# Patient Record
Sex: MALE | Race: BLACK OR AFRICAN AMERICAN | NOT HISPANIC OR LATINO | Employment: FULL TIME | URBAN - METROPOLITAN AREA
[De-identification: names, ages, dates, MRNs, and addresses within clinical notes are randomized per-mention and may not be internally consistent; named-entity substitution may affect disease eponyms.]

---

## 2019-11-11 ENCOUNTER — HOSPITAL ENCOUNTER (OUTPATIENT)
Dept: RADIOLOGY | Facility: CLINIC | Age: 24
Discharge: HOME | End: 2019-11-11
Attending: FAMILY MEDICINE
Payer: COMMERCIAL

## 2019-11-11 ENCOUNTER — OFFICE VISIT (OUTPATIENT)
Dept: FAMILY MEDICINE | Facility: CLINIC | Age: 24
End: 2019-11-11
Payer: COMMERCIAL

## 2019-11-11 VITALS
DIASTOLIC BLOOD PRESSURE: 72 MMHG | HEIGHT: 71 IN | SYSTOLIC BLOOD PRESSURE: 136 MMHG | WEIGHT: 187.38 LBS | HEART RATE: 85 BPM | BODY MASS INDEX: 26.23 KG/M2 | TEMPERATURE: 98 F | OXYGEN SATURATION: 99 %

## 2019-11-11 DIAGNOSIS — Z23 NEED FOR TDAP VACCINATION: ICD-10-CM

## 2019-11-11 DIAGNOSIS — Z00.00 WELLNESS EXAMINATION: Primary | ICD-10-CM

## 2019-11-11 DIAGNOSIS — G89.29 CHRONIC PAIN IN RIGHT FOOT: ICD-10-CM

## 2019-11-11 DIAGNOSIS — M79.671 CHRONIC PAIN IN RIGHT FOOT: ICD-10-CM

## 2019-11-11 PROCEDURE — 73630 X-RAY EXAM OF FOOT: CPT | Mod: TC,FY,PO,RT

## 2019-11-11 PROCEDURE — 99999 PR PBB SHADOW E&M-NEW PATIENT-LVL III: CPT | Mod: PBBFAC,,, | Performed by: FAMILY MEDICINE

## 2019-11-11 PROCEDURE — 90471 TDAP VACCINE GREATER THAN OR EQUAL TO 7YO IM: ICD-10-PCS | Mod: S$GLB,,, | Performed by: FAMILY MEDICINE

## 2019-11-11 PROCEDURE — 73630 X-RAY EXAM OF FOOT: CPT | Mod: 26,RT,S$GLB, | Performed by: RADIOLOGY

## 2019-11-11 PROCEDURE — 90471 IMMUNIZATION ADMIN: CPT | Mod: S$GLB,,, | Performed by: FAMILY MEDICINE

## 2019-11-11 PROCEDURE — 90715 TDAP VACCINE GREATER THAN OR EQUAL TO 7YO IM: ICD-10-PCS | Mod: S$GLB,,, | Performed by: FAMILY MEDICINE

## 2019-11-11 PROCEDURE — 99395 PREV VISIT EST AGE 18-39: CPT | Mod: 25,S$GLB,, | Performed by: FAMILY MEDICINE

## 2019-11-11 PROCEDURE — 73630 XR FOOT COMPLETE 3 VIEW RIGHT: ICD-10-PCS | Mod: 26,RT,S$GLB, | Performed by: RADIOLOGY

## 2019-11-11 PROCEDURE — 99999 PR PBB SHADOW E&M-NEW PATIENT-LVL III: ICD-10-PCS | Mod: PBBFAC,,, | Performed by: FAMILY MEDICINE

## 2019-11-11 PROCEDURE — 90715 TDAP VACCINE 7 YRS/> IM: CPT | Mod: S$GLB,,, | Performed by: FAMILY MEDICINE

## 2019-11-11 PROCEDURE — 99395 PR PREVENTIVE VISIT,EST,18-39: ICD-10-PCS | Mod: 25,S$GLB,, | Performed by: FAMILY MEDICINE

## 2019-11-11 NOTE — PROGRESS NOTES
Identified pt using name and . TDAP vaccine was administered in left deltoid using sterile technique. No bleeding noted at injection site.

## 2019-11-11 NOTE — PROGRESS NOTES
Subjective:       Patient ID: Torsten Delacruz Jr. is a 24 y.o. male.    Chief Complaint: Establish Care    HPI   Patient presents to establish care with a new family doctor and for evaluation of right ft pains that he tells me have been chronic since probably his teenage years.  He has not seen a doctor since about age 16 or 17 as he tells me is healthy without any past medical history except for a single episode of bronchitis as a child.  He did have his right foot exam and once and was told there were no abnormalities and believes he was told the pains were secondary to growing pains.  He has no pains currently but tells me yesterday with walking out of the blue he had a sharp stabbing pain that went through his midfoot from top to bottom.  This lasted only for a split 2nd at 10/10 pain intensity before becoming a mild to moderate dull achy pain that improved and resolved over minutes.  He did not have any radiation of the pain or any decrease in strength, coordination, sensation or active range of motion but tells me he did have to sit down secondary to the pains.  After these resolved he was able to resume his normal activities without any pains or other limitations.  These pains bother him sporadically in clusters but he has weeks to months without any pains whatsoever.  He has not had any labs or vaccinations since he was a child and question him about this he tells me he would like his yearly wellness physical and complete any possible labs today.  He had no other concerns or complaints and tells me currently I feel fine.    Review of Systems   Constitutional: Negative for activity change, appetite change, fatigue and unexpected weight change.   HENT: Negative for congestion, hearing loss, sore throat, trouble swallowing and voice change.    Eyes: Negative for visual disturbance.   Respiratory: Negative for cough, chest tightness, shortness of breath and wheezing.    Cardiovascular: Negative for chest pain,  "palpitations and leg swelling.   Gastrointestinal: Negative for abdominal pain, blood in stool, constipation, diarrhea, nausea and vomiting.   Genitourinary: Negative for difficulty urinating, dysuria, flank pain, frequency, hematuria and urgency.   Musculoskeletal: Positive for arthralgias (Right foot only as in the HPI.  No pain currently.). Negative for back pain, gait problem, joint swelling, myalgias, neck pain and neck stiffness.   Skin: Negative for rash and wound.   Neurological: Negative for dizziness, tremors, syncope, weakness, light-headedness, numbness and headaches.   Hematological: Negative for adenopathy. Does not bruise/bleed easily.   Psychiatric/Behavioral: Negative for dysphoric mood and sleep disturbance. The patient is not nervous/anxious.          Objective:      Vitals:    11/11/19 0906   BP: 136/72   Pulse: 85   Temp: 97.7 °F (36.5 °C)   TempSrc: Oral   SpO2: 99%   Weight: 85 kg (187 lb 6.3 oz)   Height: 5' 11" (1.803 m)   PainSc:   3   PainLoc: Generalized     Physical Exam   Constitutional: He is oriented to person, place, and time. He appears well-developed and well-nourished. No distress.   HENT:   Head: Normocephalic and atraumatic.   Right Ear: External ear normal.   Left Ear: External ear normal.   Nose: Nose normal.   Mouth/Throat: Oropharynx is clear and moist.   Eyes: Pupils are equal, round, and reactive to light. Conjunctivae and EOM are normal. No scleral icterus.   Neck: Trachea normal, normal range of motion and phonation normal. Neck supple. No JVD present. No thyroid mass and no thyromegaly present.   Cardiovascular: Normal rate, regular rhythm and normal heart sounds. Exam reveals no gallop and no friction rub.   No murmur heard.  Pulmonary/Chest: Effort normal and breath sounds normal. No respiratory distress. He has no wheezes.   Abdominal: Soft. Bowel sounds are normal. He exhibits no distension and no mass. There is no tenderness. There is no guarding. "   Musculoskeletal: Normal range of motion. He exhibits no edema or deformity.        Right hip: Normal.        Right knee: Normal.        Right ankle: Normal.        Right upper leg: Normal.        Right lower leg: Normal.        Right foot: Normal.   Lymphadenopathy:     He has no cervical adenopathy.   Neurological: He is alert and oriented to person, place, and time.   Skin: Skin is warm and dry. He is not diaphoretic.   Psychiatric: He has a normal mood and affect. His behavior is normal. Judgment and thought content normal.   Nursing note and vitals reviewed.        Assessment:       1. Wellness examination    2. Chronic pain in right foot    3. Need for Tdap vaccination          Plan:   Wellness examination  -     CBC auto differential; Future; Expected date: 11/11/2019  -     Comprehensive metabolic panel; Future; Expected date: 11/11/2019  -     Lipid panel; Future; Expected date: 11/11/2019  -     TSH; Future; Expected date: 11/11/2019    Chronic pain in right foot  -     X-Ray Foot Complete Right; Future; Expected date: 11/11/2019    Need for Tdap vaccination  -     Tdap Vaccine      Follow up in about 1 year (around 11/11/2020).    Ed appears to be stable and doing well today without any current complaints.  I am unsure of the cause of his chronic but sporadic right foot pains as I could not reproduce these today and found no abnormalities on examination of his right lower extremity.  He was very interested in a CT or MRI and I discussed checking an x-ray with chronicity of his pains.  He was very interested in this.  I did discuss use of stretching, heat/ice and deep topical muscle rub that he could try for pain relief if necessary as he had no interest in taking any over-the-counter pain medications including Tylenol or any NSAIDs.  I discussed screening labs as above and he was interested in all of these.  I did discuss STD screening with him but he had no interest in this.  I discussed the risks and  benefits of the Tdap and influenza vaccines with him any advised me that he has never had a flu shot and did not want this secondary to never having the flu.  I did discuss the risks of going unvaccinated any understands these and still had no interest in a flu shot.  He was interested in Tdap vaccination today.  As long as no significant abnormalities are found on his x-ray a labs and his foot pains remain stable, improve or resolve with the above exercises I will see him back at yearly interval.  Sooner if any problems.

## 2020-03-06 ENCOUNTER — HOSPITAL ENCOUNTER (EMERGENCY)
Facility: HOSPITAL | Age: 25
Discharge: HOME | End: 2020-03-06
Attending: EMERGENCY MEDICINE
Payer: MEDICAID

## 2020-03-06 VITALS
HEIGHT: 72 IN | BODY MASS INDEX: 24.38 KG/M2 | TEMPERATURE: 99 F | SYSTOLIC BLOOD PRESSURE: 137 MMHG | HEART RATE: 85 BPM | WEIGHT: 180 LBS | OXYGEN SATURATION: 98 % | RESPIRATION RATE: 17 BRPM | DIASTOLIC BLOOD PRESSURE: 66 MMHG

## 2020-03-06 DIAGNOSIS — S69.91XA FINGER INJURY, RIGHT, INITIAL ENCOUNTER: ICD-10-CM

## 2020-03-06 DIAGNOSIS — S61.209A AVULSION OF FINGER TIP, INITIAL ENCOUNTER: Primary | ICD-10-CM

## 2020-03-06 PROCEDURE — 99282 EMERGENCY DEPT VISIT SF MDM: CPT

## 2020-03-06 NOTE — DISCHARGE INSTRUCTIONS
Clean and dry, antibiotic topically return to ER for any signs of infection.  Use splint to protect finger from injury.  Continue with wound care return to the ER for any concerning symptoms

## 2020-03-07 NOTE — ED PROVIDER NOTES
Encounter Date: 3/6/2020       History     Chief Complaint   Patient presents with    Laceration     24 to male presenting with finger tip avulsion while working at [employer], Pt states pain to 2nd dit right hand after getting tip caught in slicer.  Last Td 3 years ago pt states.  Denies any other injury.  States bleeding has stopped.          Review of patient's allergies indicates:  No Known Allergies  Past Medical History:   Diagnosis Date    Bronchitis     once as a child     Past Surgical History:   Procedure Laterality Date    CIRCUMCISION       No family history on file.  Social History     Tobacco Use    Smoking status: Never Smoker    Smokeless tobacco: Never Used   Substance Use Topics    Alcohol use: No    Drug use: No     Review of Systems   Skin: Positive for wound.   All other systems reviewed and are negative.      Physical Exam     Initial Vitals [03/06/20 1436]   BP Pulse Resp Temp SpO2   135/89 89 16 99.2 °F (37.3 °C) 100 %      MAP       --         Physical Exam    Nursing note and vitals reviewed.  Constitutional: He appears well-developed and well-nourished.   HENT:   Head: Atraumatic.   Right Ear: External ear normal.   Mouth/Throat: Oropharynx is clear and moist.   Eyes: EOM are normal.   Neck: Neck supple.   Cardiovascular: Normal rate and regular rhythm.   Pulmonary/Chest: Breath sounds normal.   Abdominal: Soft. Bowel sounds are normal.   Musculoskeletal: Normal range of motion. He exhibits tenderness.   .5cm tip avulsion right 2nd digit with some nail affected. No nail bed laceration, no active bleeding nothing to suture    Neurological: He is oriented to person, place, and time. He has normal strength.   Skin: Skin is warm.         ED Course   Procedures  Labs Reviewed - No data to display       Imaging Results    None          Medical Decision Making:   ED Management:  Cleaned and dressed by nurse finger splint for comfort.                                   Clinical Impression:        ICD-10-CM ICD-9-CM   1. Avulsion of finger tip, initial encounter S61.209A 883.0   2. Finger injury, right, initial encounter S69.91XA 959.5         Disposition:   Disposition: Discharged  Condition: Stable     ED Disposition Condition    Discharge Stable        ED Prescriptions     None        Follow-up Information     Follow up With Specialties Details Why Contact Info    Femi Burger, DO Family Medicine In 2 days For wound re-check [address] [facility] [address]  [phone]                                       Carlee Duque PA-C  03/06/20 2153

## 2020-07-09 ENCOUNTER — OCCUPATIONAL HEALTH (OUTPATIENT)
Dept: URGENT CARE | Facility: CLINIC | Age: 25
End: 2020-07-09
Payer: MEDICAID

## 2020-07-09 DIAGNOSIS — Z02.1 PRE-EMPLOYMENT EXAMINATION: Primary | ICD-10-CM

## 2020-07-09 PROCEDURE — 80305 DRUG TEST PRSMV DIR OPT OBS: CPT | Mod: S$GLB,,, | Performed by: FAMILY MEDICINE

## 2020-07-09 PROCEDURE — 80305 OOH NON-DOT DRUG SCREEN: ICD-10-PCS | Mod: S$GLB,,, | Performed by: FAMILY MEDICINE

## 2020-07-09 PROCEDURE — 99499 UNLISTED E&M SERVICE: CPT | Mod: S$GLB,,, | Performed by: FAMILY MEDICINE

## 2020-07-09 PROCEDURE — 99499 PHYSICAL, BASIC COMPLEXITY: ICD-10-PCS | Mod: S$GLB,,, | Performed by: FAMILY MEDICINE

## 2020-11-12 ENCOUNTER — LAB VISIT (OUTPATIENT)
Dept: LAB | Facility: HOSPITAL | Age: 25
End: 2020-11-12
Attending: STUDENT IN AN ORGANIZED HEALTH CARE EDUCATION/TRAINING PROGRAM
Payer: COMMERCIAL

## 2020-11-12 ENCOUNTER — OFFICE VISIT (OUTPATIENT)
Dept: FAMILY MEDICINE | Facility: CLINIC | Age: 25
End: 2020-11-12
Payer: COMMERCIAL

## 2020-11-12 VITALS
HEIGHT: 72 IN | WEIGHT: 192.25 LBS | TEMPERATURE: 98 F | HEART RATE: 82 BPM | RESPIRATION RATE: 16 BRPM | SYSTOLIC BLOOD PRESSURE: 110 MMHG | BODY MASS INDEX: 26.04 KG/M2 | OXYGEN SATURATION: 99 % | DIASTOLIC BLOOD PRESSURE: 72 MMHG

## 2020-11-12 DIAGNOSIS — R74.01 ELEVATED ALT MEASUREMENT: ICD-10-CM

## 2020-11-12 DIAGNOSIS — R17 TOTAL BILIRUBIN, ELEVATED: ICD-10-CM

## 2020-11-12 DIAGNOSIS — Z00.00 ENCOUNTER FOR PREVENTIVE HEALTH EXAMINATION: ICD-10-CM

## 2020-11-12 DIAGNOSIS — Z00.00 ENCOUNTER FOR PREVENTIVE HEALTH EXAMINATION: Primary | ICD-10-CM

## 2020-11-12 DIAGNOSIS — E78.5 HYPERLIPIDEMIA, UNSPECIFIED HYPERLIPIDEMIA TYPE: ICD-10-CM

## 2020-11-12 LAB
ALBUMIN SERPL BCP-MCNC: 4.5 G/DL (ref 3.5–5.2)
ALP SERPL-CCNC: 45 U/L (ref 55–135)
ALT SERPL W/O P-5'-P-CCNC: 21 U/L (ref 10–44)
ANION GAP SERPL CALC-SCNC: 9 MMOL/L (ref 8–16)
AST SERPL-CCNC: 20 U/L (ref 10–40)
BASOPHILS # BLD AUTO: 0.03 K/UL (ref 0–0.2)
BASOPHILS NFR BLD: 0.7 % (ref 0–1.9)
BILIRUB SERPL-MCNC: 1.3 MG/DL (ref 0.1–1)
BUN SERPL-MCNC: 17 MG/DL (ref 6–20)
CALCIUM SERPL-MCNC: 9.6 MG/DL (ref 8.7–10.5)
CHLORIDE SERPL-SCNC: 104 MMOL/L (ref 95–110)
CHOLEST SERPL-MCNC: 180 MG/DL (ref 120–199)
CHOLEST/HDLC SERPL: 3.8 {RATIO} (ref 2–5)
CO2 SERPL-SCNC: 26 MMOL/L (ref 23–29)
CREAT SERPL-MCNC: 1.1 MG/DL (ref 0.5–1.4)
DIFFERENTIAL METHOD: NORMAL
EOSINOPHIL # BLD AUTO: 0.1 K/UL (ref 0–0.5)
EOSINOPHIL NFR BLD: 1.2 % (ref 0–8)
ERYTHROCYTE [DISTWIDTH] IN BLOOD BY AUTOMATED COUNT: 12 % (ref 11.5–14.5)
EST. GFR  (AFRICAN AMERICAN): >60 ML/MIN/1.73 M^2
EST. GFR  (NON AFRICAN AMERICAN): >60 ML/MIN/1.73 M^2
ESTIMATED AVG GLUCOSE: 97 MG/DL (ref 68–131)
GLUCOSE SERPL-MCNC: 82 MG/DL (ref 70–110)
HBA1C MFR BLD HPLC: 5 % (ref 4–5.6)
HCT VFR BLD AUTO: 50.4 % (ref 40–54)
HDLC SERPL-MCNC: 47 MG/DL (ref 40–75)
HDLC SERPL: 26.1 % (ref 20–50)
HGB BLD-MCNC: 16.8 G/DL (ref 14–18)
IMM GRANULOCYTES # BLD AUTO: 0.01 K/UL (ref 0–0.04)
IMM GRANULOCYTES NFR BLD AUTO: 0.2 % (ref 0–0.5)
LDLC SERPL CALC-MCNC: 118.6 MG/DL (ref 63–159)
LYMPHOCYTES # BLD AUTO: 1.3 K/UL (ref 1–4.8)
LYMPHOCYTES NFR BLD: 30.7 % (ref 18–48)
MCH RBC QN AUTO: 28.4 PG (ref 27–31)
MCHC RBC AUTO-ENTMCNC: 33.3 G/DL (ref 32–36)
MCV RBC AUTO: 85 FL (ref 82–98)
MONOCYTES # BLD AUTO: 0.5 K/UL (ref 0.3–1)
MONOCYTES NFR BLD: 10.9 % (ref 4–15)
NEUTROPHILS # BLD AUTO: 2.4 K/UL (ref 1.8–7.7)
NEUTROPHILS NFR BLD: 56.3 % (ref 38–73)
NONHDLC SERPL-MCNC: 133 MG/DL
NRBC BLD-RTO: 0 /100 WBC
PLATELET # BLD AUTO: 248 K/UL (ref 150–350)
PMV BLD AUTO: 10.2 FL (ref 9.2–12.9)
POTASSIUM SERPL-SCNC: 4.1 MMOL/L (ref 3.5–5.1)
PROT SERPL-MCNC: 7.8 G/DL (ref 6–8.4)
RBC # BLD AUTO: 5.91 M/UL (ref 4.6–6.2)
SODIUM SERPL-SCNC: 139 MMOL/L (ref 136–145)
TRIGL SERPL-MCNC: 72 MG/DL (ref 30–150)
WBC # BLD AUTO: 4.33 K/UL (ref 3.9–12.7)

## 2020-11-12 PROCEDURE — 36415 COLL VENOUS BLD VENIPUNCTURE: CPT | Mod: PO

## 2020-11-12 PROCEDURE — 87340 HEPATITIS B SURFACE AG IA: CPT

## 2020-11-12 PROCEDURE — 83036 HEMOGLOBIN GLYCOSYLATED A1C: CPT

## 2020-11-12 PROCEDURE — 1126F PR PAIN SEVERITY QUANTIFIED, NO PAIN PRESENT: ICD-10-PCS | Mod: S$GLB,,, | Performed by: STUDENT IN AN ORGANIZED HEALTH CARE EDUCATION/TRAINING PROGRAM

## 2020-11-12 PROCEDURE — 1126F AMNT PAIN NOTED NONE PRSNT: CPT | Mod: S$GLB,,, | Performed by: STUDENT IN AN ORGANIZED HEALTH CARE EDUCATION/TRAINING PROGRAM

## 2020-11-12 PROCEDURE — 99999 PR PBB SHADOW E&M-EST. PATIENT-LVL III: ICD-10-PCS | Mod: PBBFAC,,, | Performed by: STUDENT IN AN ORGANIZED HEALTH CARE EDUCATION/TRAINING PROGRAM

## 2020-11-12 PROCEDURE — 80061 LIPID PANEL: CPT

## 2020-11-12 PROCEDURE — 99214 PR OFFICE/OUTPT VISIT, EST, LEVL IV, 30-39 MIN: ICD-10-PCS | Mod: S$GLB,,, | Performed by: STUDENT IN AN ORGANIZED HEALTH CARE EDUCATION/TRAINING PROGRAM

## 2020-11-12 PROCEDURE — 86803 HEPATITIS C AB TEST: CPT

## 2020-11-12 PROCEDURE — 99999 PR PBB SHADOW E&M-EST. PATIENT-LVL III: CPT | Mod: PBBFAC,,, | Performed by: STUDENT IN AN ORGANIZED HEALTH CARE EDUCATION/TRAINING PROGRAM

## 2020-11-12 PROCEDURE — 3008F BODY MASS INDEX DOCD: CPT | Mod: CPTII,S$GLB,, | Performed by: STUDENT IN AN ORGANIZED HEALTH CARE EDUCATION/TRAINING PROGRAM

## 2020-11-12 PROCEDURE — 3008F PR BODY MASS INDEX (BMI) DOCUMENTED: ICD-10-PCS | Mod: CPTII,S$GLB,, | Performed by: STUDENT IN AN ORGANIZED HEALTH CARE EDUCATION/TRAINING PROGRAM

## 2020-11-12 PROCEDURE — 80053 COMPREHEN METABOLIC PANEL: CPT

## 2020-11-12 PROCEDURE — 99214 OFFICE O/P EST MOD 30 MIN: CPT | Mod: S$GLB,,, | Performed by: STUDENT IN AN ORGANIZED HEALTH CARE EDUCATION/TRAINING PROGRAM

## 2020-11-12 PROCEDURE — 85025 COMPLETE CBC W/AUTO DIFF WBC: CPT

## 2020-11-12 NOTE — PROGRESS NOTES
[facility]  Subjective:    Chief Complaint:   Chief Complaint   Patient presents with    Annual Exam       History of Present Illness:  25 y.o. male presents for multiple issues.      Hyperlipidemia-stable repeat today he reports some weight gain with decreased activity.  Recommended diet exercise and will check this yearly.  Use corn teen and knots is moving LEs.   Elevated ALT-control uncertain will recheck today. Will rule out hcv and hep b and suspect fatty liver. Will recheck. Rec diet and exercise. No alchol use   Elev T bili repeat suspect Gilbert's syndrome will monitor no symptoms   Prev- will get blood work and he does not want vaccines.     This is the extent of the patient's complaints at this present time.     He denies chest pain upon exertion, dyspnea, nausea, vomiting, diaphoresis, and syncope. No pleuritic chest pain, unilateral leg swelling, calf tenderness, or calf pain.     He denies having a family history of cancer.     The following portions of the patient's history were reviewed and updated as appropriate: allergies, current medications, past family history, past medical history, past social history, past surgical history and problem list.    Review of Systems [Negative unless checked off]  Gen ROS: []fever []chills []weight loss []malaise []fatigue   Neuro: []dizzy []numbness []LOC []weakness []headaches   Psych ROS: []hallucinate [] depression []anxiety []suicidal ideation    ENT ROS: []congestion []rhinorrhea []sore throat []neck pain []hearing loss   Eye ROS: []hazy vision [] diplopia []photophobia []eye pain    Pulm ROS: []cough []pleuritic pain []dyspnea []wheezing    [facility] : []chest pain []SOB on exertion []orthopnea []PND []leg swelling   GI ROS: []n/v []abd pain []diarrhea []constipation []blood/black stool   Uro ROS: []dysuria []frequency []flank pain [] trouble voiding []hematuria   MSK ROS: []myalgias []joint pain []neck pain []back pain [] falls   Derm  ROS: []pruritis []rash []jaundice        Past Medical History:   Diagnosis Date    Bronchitis     once as a child    Hyperlipidemia      Past Surgical History:   Procedure Laterality Date    CIRCUMCISION       Social History  Social History     Tobacco Use    Smoking status: Never Smoker    Smokeless tobacco: Never Used   Substance Use Topics    Alcohol use: No    Drug use: No     Family History   Problem Relation Age of Onset    No Known Problems Mother     Diabetes Father      Review of patient's allergies indicates:  No Known Allergies    Physical Examination  /72 (BP Location: Right arm, Patient Position: Sitting, BP Method: Large (Manual))   Pulse 82   Temp 97.8 °F (36.6 °C) (Temporal)   Resp 16   Ht 6' (1.829 m)   Wt 87.2 kg (192 lb 3.9 oz)   SpO2 99%   BMI 26.07 kg/m²   Wt Readings from Last 3 Encounters:   11/12/20 87.2 kg (192 lb 3.9 oz)   03/06/20 81.6 kg (180 lb)   11/11/19 85 kg (187 lb 6.3 oz)     BP Readings from Last 3 Encounters:   11/12/20 110/72   03/06/20 137/66   11/11/19 136/72     Estimated body mass index is 26.07 kg/m² as calculated from the following:    Height as of this encounter: 6' (1.829 m).    Weight as of this encounter: 87.2 kg (192 lb 3.9 oz).     General appearance: alert, cooperative, no distress  Eyes: pupils equal and reactive, extraocular eye movements intact   Ears: bilateral TM's and external ear canals normal   Nose: normal and patent, no erythema, discharge or polyps   Sinuses: Normal paranasal sinuses without tenderness   Throat: mucous membranes moist, pharynx normal without lesions   Neck: no thyromegaly, trachea midline  Lungs: clear to auscultation, no wheezes, rales or rhonchi, symmetric air entry, no dullness to percussion bilaterally.  Heart: normal rate, regular rhythm, normal S1, S2, no murmurs, rubs, clicks or gallops, no displacement of the PMI.  Abdomen: soft, nontender, nondistended, no masses or organomegaly No rigidity, rebound, or  guarding.   Back: full range of motion, no tenderness, palpable spasm or pain on motion   Extremities: peripheral pulses normal, no pedal edema, no clubbing or cyanosis   Feet: warm, good capillary refill.  Neurological:alert, oriented, normal speech, no focal findings or movement disorder noted   Psychiatric: alert, oriented to person, place, and time  Integument: normal coloration and turgor, no rashes, no suspicious skin lesions noted.    Data reviewed    Previous medical records reviewed and summarized above in HPI.     Laboratory    I have reviewed old labs below:    Lab Results   Component Value Date    WBC 5.21 11/11/2019    HGB 16.9 11/11/2019    HCT 51.6 11/11/2019    MCV 86 11/11/2019     11/11/2019     11/11/2019    K 3.9 11/11/2019     11/11/2019    CALCIUM 10.1 11/11/2019    CO2 27 11/11/2019    GLU 74 11/11/2019    BUN 14 11/11/2019    CREATININE 1.1 11/11/2019    ANIONGAP 9 11/11/2019    ESTGFRAFRICA >60.0 11/11/2019    EGFRNONAA >60.0 11/11/2019    PROT 8.0 11/11/2019    ALBUMIN 4.6 11/11/2019    BILITOT 1.1 (H) 11/11/2019    ALKPHOS 52 (L) 11/11/2019    ALT 30 11/11/2019    AST 47 (H) 11/11/2019    CHOL 210 (H) 11/11/2019    TRIG 87 11/11/2019    HDL 49 11/11/2019    LDLCALC 143.6 11/11/2019    TSH 1.543 11/11/2019     Lab reviewed by me: Particular labs of significance that I will monitor, workup, or treat to improve are marked below.     elev cholesterol and elv alt     Assessment/Plan    Torsten Delacruz Jr. is a 25 y.o. male who presents to clinic with:    1. Encounter for preventive health examination    2. Hyperlipidemia, unspecified hyperlipidemia type    3. Elevated ALT measurement    4. Total bilirubin, elevated         Diagnostic impression remarks      Hyperlipidemia-stable repeat today he reports some weight gain with decreased activity.  Recommended diet exercise and will check this yearly.  Use corn teen and knots is moving LEs.   Elevated ALT-control uncertain will  recheck today. Will rule out hcv and hep b and suspect fatty liver. Will recheck. Rec diet and exercise. No alchol use   Elev T bili repeat suspect Gilbert's syndrome will monitor no symptoms   Prev- will get blood work and he does not want vaccines.     BMI Goal    Counseled patient on his ideal body weight, health consequences of being obese and current recommendations including weekly exercise and a heart healthy diet.  He is aware that ideal BMI < 25  Estimated body mass index is 26.07 kg/m² as calculated from the following:    Height as of this encounter: 6' (1.829 m).    Weight as of this encounter: 87.2 kg (192 lb 3.9 oz).     He was counseled about the importance of healthy dietary habits as well as routine physical activity and exercise for better health outcomes. I also discussed the importance of cancer screening.     Medication Monitoring    In today's visit, monitoring for drug toxicity was accomplished. Proper use of medications was also discussed.     Counseling    Eat Less Unhealthy Fat   -Cut back on saturated fats and trans (also called hydrogenated) fats. A diet thats high in these fats increases your bad cholesterol. Its not enough to just cut back on foods containing cholesterol.   -Eat about 2 servings of fish per week. Most fish contain omega-3 fatty acids. These help lower blood cholesterol.   -Eat more whole grains and soluble fiber (such as oat bran). These lower overall cholesterol.   -Counseled that most cholesterol is made in the liver and only a minority comes from diet.    Be Active   -Choose an activity you enjoy. Walking, swimming, and riding a bike are some good ways to be active.   -Start at a level where you feel comfortable. Increase your time and pace a little each week.   -Work up to 30 minutes on most days. You can break this up into three 10-minute periods.   -Remember, some activity is better than none.   -If you havent been exercising regularly, start slowly. Check  with your doctor to make sure the exercise plan is right for you.     Take Medication As Directed: Many patients need medication to get their LDL levels to a safe level. Medication to lower cholesterol levels is effective and safe. (But taking medication is not a substitute for exercise or watching your diet!).      I discussed imaging findings, diagnosis, possibilities, treatment options, medications, risks, and benefits. He had many questions regarding the options and long-term effects. All questions were answered. He expressed understanding after counseling regarding the diagnosis and recommendations. He was capable and demonstrated competence with understanding of these options. Shared decision making was performed resulting in him choosing the current treatment plan.     I also discussed the importance of close follow up to discuss labs, change or modify his medications if needed, monitor side effects, and further evaluation of medical problems.     Additional workup planned: see labs ordered below.    See below for labs and meds ordered with associated diagnosis    1. Encounter for preventive health examination  - Lipid Panel; Future  - Comprehensive Metabolic Panel; Future  - CBC Auto Differential; Future  - Hemoglobin A1C; Future    2. Hyperlipidemia, unspecified hyperlipidemia type    3. Elevated ALT measurement  - Hepatitis C Antibody; Future  - Hepatitis B Surface Antigen; Future    4. Total bilirubin, elevated       Modified Medications    No medications on file       Follow up in about 1 year (around 11/12/2021). for further workup and reassessment if labs and tests obtained are stable or sooner as needed. He was instructed to call the clinic or go to the emergency department if his symptoms do not improve, worsens, or if new symptoms develop. Patient knows to call any time if an emergency arises. Shared decision making occurred and he verbalized understanding in agreement with this plan.  "    Documentation entered by me for this encounter may have been done in part using speech-recognition technology. Although I have made an effort to ensure accuracy, "sound like" errors may exist and should be interpreted in context.     Brad Cornejo MD  11/12/2020     "

## 2020-11-13 LAB
HBV SURFACE AG SERPL QL IA: NEGATIVE
HCV AB SERPL QL IA: NEGATIVE

## 2020-11-13 NOTE — PROGRESS NOTES
Results are mildly abnormal but no change in therapy is needed at this time except monitoring with follow up. Patient notified of results and to continue current treatment plan we discussed in clinic. Elevated T bili likely from Gilbert's syndrome.     Counseling included discussion regarding imaging findings, diagnosis, possibilities, treatment options, medications, risks, and benefits. All questions were answered.      I discussed the importance of close follow up to discuss labs, change or modify medications if needed, monitor side effects, and further evaluation of medical problems.     Brad Cornejo MD  11/13/2020

## 2020-12-12 ENCOUNTER — PATIENT MESSAGE (OUTPATIENT)
Dept: FAMILY MEDICINE | Facility: CLINIC | Age: 25
End: 2020-12-12

## 2021-05-10 ENCOUNTER — PATIENT MESSAGE (OUTPATIENT)
Dept: RESEARCH | Facility: HOSPITAL | Age: 26
End: 2021-05-10

## 2021-05-10 ENCOUNTER — HOSPITAL ENCOUNTER (EMERGENCY)
Facility: HOSPITAL | Age: 26
Discharge: HOME | End: 2021-05-10
Attending: EMERGENCY MEDICINE
Payer: COMMERCIAL

## 2021-05-10 VITALS
OXYGEN SATURATION: 96 % | DIASTOLIC BLOOD PRESSURE: 70 MMHG | TEMPERATURE: 98 F | RESPIRATION RATE: 21 BRPM | BODY MASS INDEX: 26.6 KG/M2 | HEIGHT: 71 IN | WEIGHT: 190 LBS | SYSTOLIC BLOOD PRESSURE: 113 MMHG | HEART RATE: 75 BPM

## 2021-05-10 DIAGNOSIS — T78.40XA ALLERGIC REACTION, INITIAL ENCOUNTER: ICD-10-CM

## 2021-05-10 DIAGNOSIS — L50.9 URTICARIA: Primary | ICD-10-CM

## 2021-05-10 LAB
ALBUMIN SERPL BCP-MCNC: 4.8 G/DL (ref 3.5–5.2)
ALP SERPL-CCNC: 45 U/L (ref 55–135)
ALT SERPL W/O P-5'-P-CCNC: 36 U/L (ref 10–44)
ANION GAP SERPL CALC-SCNC: 11 MMOL/L (ref 8–16)
AST SERPL-CCNC: 30 U/L (ref 10–40)
BASOPHILS # BLD AUTO: 0.02 K/UL (ref 0–0.2)
BASOPHILS NFR BLD: 0.4 % (ref 0–1.9)
BILIRUB SERPL-MCNC: 1.4 MG/DL (ref 0.1–1)
BILIRUB UR QL STRIP: NEGATIVE
BUN SERPL-MCNC: 15 MG/DL (ref 6–20)
CALCIUM SERPL-MCNC: 9.1 MG/DL (ref 8.7–10.5)
CHLORIDE SERPL-SCNC: 101 MMOL/L (ref 95–110)
CLARITY UR: CLEAR
CO2 SERPL-SCNC: 26 MMOL/L (ref 23–29)
COLOR UR: YELLOW
CREAT SERPL-MCNC: 1.1 MG/DL (ref 0.5–1.4)
DIFFERENTIAL METHOD: NORMAL
EOSINOPHIL # BLD AUTO: 0.1 K/UL (ref 0–0.5)
EOSINOPHIL NFR BLD: 1.1 % (ref 0–8)
ERYTHROCYTE [DISTWIDTH] IN BLOOD BY AUTOMATED COUNT: 12.2 % (ref 11.5–14.5)
EST. GFR  (AFRICAN AMERICAN): >60 ML/MIN/1.73 M^2
EST. GFR  (NON AFRICAN AMERICAN): >60 ML/MIN/1.73 M^2
GLUCOSE SERPL-MCNC: 101 MG/DL (ref 70–110)
GLUCOSE UR QL STRIP: NEGATIVE
HCT VFR BLD AUTO: 45.9 % (ref 40–54)
HGB BLD-MCNC: 15.7 G/DL (ref 14–18)
HGB UR QL STRIP: NEGATIVE
IMM GRANULOCYTES # BLD AUTO: 0.01 K/UL (ref 0–0.04)
IMM GRANULOCYTES NFR BLD AUTO: 0.2 % (ref 0–0.5)
KETONES UR QL STRIP: NEGATIVE
LEUKOCYTE ESTERASE UR QL STRIP: NEGATIVE
LYMPHOCYTES # BLD AUTO: 1.9 K/UL (ref 1–4.8)
LYMPHOCYTES NFR BLD: 34.6 % (ref 18–48)
MCH RBC QN AUTO: 28.8 PG (ref 27–31)
MCHC RBC AUTO-ENTMCNC: 34.2 G/DL (ref 32–36)
MCV RBC AUTO: 84 FL (ref 82–98)
MONOCYTES # BLD AUTO: 0.6 K/UL (ref 0.3–1)
MONOCYTES NFR BLD: 11.5 % (ref 4–15)
NEUTROPHILS # BLD AUTO: 2.8 K/UL (ref 1.8–7.7)
NEUTROPHILS NFR BLD: 52.2 % (ref 38–73)
NITRITE UR QL STRIP: NEGATIVE
NRBC BLD-RTO: 0 /100 WBC
PH UR STRIP: 7 [PH] (ref 5–8)
PLATELET # BLD AUTO: 228 K/UL (ref 150–450)
PMV BLD AUTO: 10.1 FL (ref 9.2–12.9)
POTASSIUM SERPL-SCNC: 3.6 MMOL/L (ref 3.5–5.1)
PROT SERPL-MCNC: 7.8 G/DL (ref 6–8.4)
PROT UR QL STRIP: NEGATIVE
RBC # BLD AUTO: 5.45 M/UL (ref 4.6–6.2)
SODIUM SERPL-SCNC: 138 MMOL/L (ref 136–145)
SP GR UR STRIP: 1.02 (ref 1–1.03)
URN SPEC COLLECT METH UR: ABNORMAL
UROBILINOGEN UR STRIP-ACNC: ABNORMAL EU/DL
WBC # BLD AUTO: 5.41 K/UL (ref 3.9–12.7)

## 2021-05-10 PROCEDURE — 96375 TX/PRO/DX INJ NEW DRUG ADDON: CPT

## 2021-05-10 PROCEDURE — 36415 COLL VENOUS BLD VENIPUNCTURE: CPT | Performed by: EMERGENCY MEDICINE

## 2021-05-10 PROCEDURE — 80053 COMPREHEN METABOLIC PANEL: CPT | Performed by: EMERGENCY MEDICINE

## 2021-05-10 PROCEDURE — 63600175 PHARM REV CODE 636 W HCPCS: Performed by: EMERGENCY MEDICINE

## 2021-05-10 PROCEDURE — 81003 URINALYSIS AUTO W/O SCOPE: CPT | Performed by: EMERGENCY MEDICINE

## 2021-05-10 PROCEDURE — 99284 EMERGENCY DEPT VISIT MOD MDM: CPT | Mod: 25

## 2021-05-10 PROCEDURE — 25000003 PHARM REV CODE 250: Performed by: EMERGENCY MEDICINE

## 2021-05-10 PROCEDURE — 96374 THER/PROPH/DIAG INJ IV PUSH: CPT

## 2021-05-10 PROCEDURE — 85025 COMPLETE CBC W/AUTO DIFF WBC: CPT | Performed by: EMERGENCY MEDICINE

## 2021-05-10 RX ORDER — DIPHENHYDRAMINE HYDROCHLORIDE 50 MG/ML
25 INJECTION INTRAMUSCULAR; INTRAVENOUS
Status: COMPLETED | OUTPATIENT
Start: 2021-05-10 | End: 2021-05-10

## 2021-05-10 RX ORDER — FAMOTIDINE 10 MG/ML
20 INJECTION INTRAVENOUS
Status: COMPLETED | OUTPATIENT
Start: 2021-05-10 | End: 2021-05-10

## 2021-05-10 RX ORDER — PREDNISONE 20 MG/1
20 TABLET ORAL DAILY
Qty: 5 TABLET | Refills: 0 | Status: SHIPPED | OUTPATIENT
Start: 2021-05-10 | End: 2021-05-15

## 2021-05-10 RX ORDER — METHYLPREDNISOLONE SOD SUCC 125 MG
125 VIAL (EA) INJECTION
Status: COMPLETED | OUTPATIENT
Start: 2021-05-10 | End: 2021-05-10

## 2021-05-10 RX ADMIN — FAMOTIDINE 20 MG: 10 INJECTION INTRAVENOUS at 07:05

## 2021-05-10 RX ADMIN — DIPHENHYDRAMINE HYDROCHLORIDE 25 MG: 50 INJECTION INTRAMUSCULAR; INTRAVENOUS at 07:05

## 2021-05-10 RX ADMIN — METHYLPREDNISOLONE SODIUM SUCCINATE 125 MG: 125 INJECTION, POWDER, FOR SOLUTION INTRAMUSCULAR; INTRAVENOUS at 07:05

## 2021-05-12 ENCOUNTER — TELEPHONE (OUTPATIENT)
Dept: FAMILY MEDICINE | Facility: CLINIC | Age: 26
End: 2021-05-12

## 2021-05-13 ENCOUNTER — OFFICE VISIT (OUTPATIENT)
Dept: FAMILY MEDICINE | Facility: CLINIC | Age: 26
End: 2021-05-13
Payer: COMMERCIAL

## 2021-05-13 VITALS
HEIGHT: 71 IN | OXYGEN SATURATION: 97 % | DIASTOLIC BLOOD PRESSURE: 72 MMHG | SYSTOLIC BLOOD PRESSURE: 120 MMHG | WEIGHT: 205.69 LBS | RESPIRATION RATE: 18 BRPM | BODY MASS INDEX: 28.8 KG/M2 | HEART RATE: 97 BPM | TEMPERATURE: 97 F

## 2021-05-13 DIAGNOSIS — Z09 FOLLOW UP: Primary | ICD-10-CM

## 2021-05-13 DIAGNOSIS — L50.9 HIVES: ICD-10-CM

## 2021-05-13 DIAGNOSIS — E66.3 OVERWEIGHT (BMI 25.0-29.9): ICD-10-CM

## 2021-05-13 PROCEDURE — 99214 OFFICE O/P EST MOD 30 MIN: CPT | Mod: S$GLB,,, | Performed by: FAMILY MEDICINE

## 2021-05-13 PROCEDURE — 1126F AMNT PAIN NOTED NONE PRSNT: CPT | Mod: S$GLB,,, | Performed by: FAMILY MEDICINE

## 2021-05-13 PROCEDURE — 3008F BODY MASS INDEX DOCD: CPT | Mod: CPTII,S$GLB,, | Performed by: FAMILY MEDICINE

## 2021-05-13 PROCEDURE — 3008F PR BODY MASS INDEX (BMI) DOCUMENTED: ICD-10-PCS | Mod: CPTII,S$GLB,, | Performed by: FAMILY MEDICINE

## 2021-05-13 PROCEDURE — 99214 PR OFFICE/OUTPT VISIT, EST, LEVL IV, 30-39 MIN: ICD-10-PCS | Mod: S$GLB,,, | Performed by: FAMILY MEDICINE

## 2021-05-13 PROCEDURE — 1126F PR PAIN SEVERITY QUANTIFIED, NO PAIN PRESENT: ICD-10-PCS | Mod: S$GLB,,, | Performed by: FAMILY MEDICINE

## 2021-05-13 PROCEDURE — 99999 PR PBB SHADOW E&M-EST. PATIENT-LVL III: ICD-10-PCS | Mod: PBBFAC,,, | Performed by: FAMILY MEDICINE

## 2021-05-13 PROCEDURE — 99999 PR PBB SHADOW E&M-EST. PATIENT-LVL III: CPT | Mod: PBBFAC,,, | Performed by: FAMILY MEDICINE

## 2021-05-13 RX ORDER — DIPHENHYDRAMINE HCL 25 MG
25 TABLET ORAL NIGHTLY PRN
COMMUNITY
End: 2022-07-29

## 2021-05-18 ENCOUNTER — OFFICE VISIT (OUTPATIENT)
Dept: ALLERGY | Facility: CLINIC | Age: 26
End: 2021-05-18
Payer: COMMERCIAL

## 2021-05-18 VITALS
TEMPERATURE: 98 F | SYSTOLIC BLOOD PRESSURE: 118 MMHG | BODY MASS INDEX: 27.3 KG/M2 | DIASTOLIC BLOOD PRESSURE: 80 MMHG | HEART RATE: 100 BPM | WEIGHT: 195 LBS | OXYGEN SATURATION: 98 % | HEIGHT: 71 IN

## 2021-05-18 DIAGNOSIS — L50.8 ACUTE URTICARIA: Primary | ICD-10-CM

## 2021-05-18 DIAGNOSIS — T78.3XXA ANGIOEDEMA, INITIAL ENCOUNTER: ICD-10-CM

## 2021-05-18 PROCEDURE — 99203 OFFICE O/P NEW LOW 30 MIN: CPT | Mod: S$GLB,,, | Performed by: ALLERGY & IMMUNOLOGY

## 2021-05-18 PROCEDURE — 3008F BODY MASS INDEX DOCD: CPT | Mod: S$GLB,,, | Performed by: ALLERGY & IMMUNOLOGY

## 2021-05-18 PROCEDURE — 3008F PR BODY MASS INDEX (BMI) DOCUMENTED: ICD-10-PCS | Mod: S$GLB,,, | Performed by: ALLERGY & IMMUNOLOGY

## 2021-05-18 PROCEDURE — 99203 PR OFFICE/OUTPT VISIT, NEW, LEVL III, 30-44 MIN: ICD-10-PCS | Mod: S$GLB,,, | Performed by: ALLERGY & IMMUNOLOGY

## 2021-05-18 RX ORDER — LEVOCETIRIZINE DIHYDROCHLORIDE 5 MG/1
5 TABLET, FILM COATED ORAL 2 TIMES DAILY
Qty: 60 TABLET | Refills: 11 | Status: SHIPPED | OUTPATIENT
Start: 2021-05-18 | End: 2021-06-29 | Stop reason: SDUPTHER

## 2021-06-29 ENCOUNTER — OFFICE VISIT (OUTPATIENT)
Dept: ALLERGY | Facility: CLINIC | Age: 26
End: 2021-06-29
Payer: COMMERCIAL

## 2021-06-29 VITALS
HEIGHT: 71 IN | WEIGHT: 195 LBS | HEART RATE: 87 BPM | OXYGEN SATURATION: 97 % | BODY MASS INDEX: 27.3 KG/M2 | SYSTOLIC BLOOD PRESSURE: 131 MMHG | DIASTOLIC BLOOD PRESSURE: 81 MMHG | TEMPERATURE: 98 F

## 2021-06-29 DIAGNOSIS — T78.3XXA ANGIOEDEMA, INITIAL ENCOUNTER: ICD-10-CM

## 2021-06-29 DIAGNOSIS — L50.8 ACUTE URTICARIA: Primary | ICD-10-CM

## 2021-06-29 DIAGNOSIS — T78.3XXD ANGIOEDEMA, SUBSEQUENT ENCOUNTER: ICD-10-CM

## 2021-06-29 PROCEDURE — 99213 PR OFFICE/OUTPT VISIT, EST, LEVL III, 20-29 MIN: ICD-10-PCS | Mod: S$GLB,,, | Performed by: ALLERGY & IMMUNOLOGY

## 2021-06-29 PROCEDURE — 3008F BODY MASS INDEX DOCD: CPT | Mod: S$GLB,,, | Performed by: ALLERGY & IMMUNOLOGY

## 2021-06-29 PROCEDURE — 3008F PR BODY MASS INDEX (BMI) DOCUMENTED: ICD-10-PCS | Mod: S$GLB,,, | Performed by: ALLERGY & IMMUNOLOGY

## 2021-06-29 PROCEDURE — 99213 OFFICE O/P EST LOW 20 MIN: CPT | Mod: S$GLB,,, | Performed by: ALLERGY & IMMUNOLOGY

## 2021-06-29 RX ORDER — LEVOCETIRIZINE DIHYDROCHLORIDE 5 MG/1
10 TABLET, FILM COATED ORAL 2 TIMES DAILY
Qty: 120 TABLET | Refills: 6 | Status: SHIPPED | OUTPATIENT
Start: 2021-06-29 | End: 2021-11-19

## 2021-11-19 ENCOUNTER — LAB VISIT (OUTPATIENT)
Dept: LAB | Facility: HOSPITAL | Age: 26
End: 2021-11-19
Attending: STUDENT IN AN ORGANIZED HEALTH CARE EDUCATION/TRAINING PROGRAM
Payer: COMMERCIAL

## 2021-11-19 ENCOUNTER — OFFICE VISIT (OUTPATIENT)
Dept: FAMILY MEDICINE | Facility: CLINIC | Age: 26
End: 2021-11-19
Payer: COMMERCIAL

## 2021-11-19 VITALS
TEMPERATURE: 98 F | WEIGHT: 207.25 LBS | DIASTOLIC BLOOD PRESSURE: 80 MMHG | BODY MASS INDEX: 29.02 KG/M2 | HEART RATE: 67 BPM | RESPIRATION RATE: 16 BRPM | OXYGEN SATURATION: 99 % | HEIGHT: 71 IN | SYSTOLIC BLOOD PRESSURE: 116 MMHG

## 2021-11-19 DIAGNOSIS — R53.83 FATIGUE, UNSPECIFIED TYPE: ICD-10-CM

## 2021-11-19 DIAGNOSIS — E78.5 HYPERLIPIDEMIA, UNSPECIFIED HYPERLIPIDEMIA TYPE: ICD-10-CM

## 2021-11-19 DIAGNOSIS — Z00.00 ENCOUNTER FOR PREVENTIVE HEALTH EXAMINATION: ICD-10-CM

## 2021-11-19 DIAGNOSIS — R25.1 TREMOR: ICD-10-CM

## 2021-11-19 DIAGNOSIS — Z00.00 ENCOUNTER FOR PREVENTIVE HEALTH EXAMINATION: Primary | ICD-10-CM

## 2021-11-19 LAB
ALBUMIN SERPL BCP-MCNC: 4.5 G/DL (ref 3.5–5.2)
ALP SERPL-CCNC: 56 U/L (ref 55–135)
ALT SERPL W/O P-5'-P-CCNC: 34 U/L (ref 10–44)
ANION GAP SERPL CALC-SCNC: 11 MMOL/L (ref 8–16)
AST SERPL-CCNC: 27 U/L (ref 10–40)
BASOPHILS # BLD AUTO: 0.03 K/UL (ref 0–0.2)
BASOPHILS NFR BLD: 0.6 % (ref 0–1.9)
BILIRUB SERPL-MCNC: 0.8 MG/DL (ref 0.1–1)
BUN SERPL-MCNC: 15 MG/DL (ref 6–20)
CALCIUM SERPL-MCNC: 9.8 MG/DL (ref 8.7–10.5)
CHLORIDE SERPL-SCNC: 102 MMOL/L (ref 95–110)
CHOLEST SERPL-MCNC: 211 MG/DL (ref 120–199)
CHOLEST/HDLC SERPL: 4.1 {RATIO} (ref 2–5)
CO2 SERPL-SCNC: 26 MMOL/L (ref 23–29)
CREAT SERPL-MCNC: 1 MG/DL (ref 0.5–1.4)
DIFFERENTIAL METHOD: NORMAL
EOSINOPHIL # BLD AUTO: 0.1 K/UL (ref 0–0.5)
EOSINOPHIL NFR BLD: 1.4 % (ref 0–8)
ERYTHROCYTE [DISTWIDTH] IN BLOOD BY AUTOMATED COUNT: 12 % (ref 11.5–14.5)
EST. GFR  (AFRICAN AMERICAN): >60 ML/MIN/1.73 M^2
EST. GFR  (NON AFRICAN AMERICAN): >60 ML/MIN/1.73 M^2
FERRITIN SERPL-MCNC: 85 NG/ML (ref 20–300)
GLUCOSE SERPL-MCNC: 84 MG/DL (ref 70–110)
HCT VFR BLD AUTO: 51.9 % (ref 40–54)
HDLC SERPL-MCNC: 52 MG/DL (ref 40–75)
HDLC SERPL: 24.6 % (ref 20–50)
HGB BLD-MCNC: 17.1 G/DL (ref 14–18)
IMM GRANULOCYTES # BLD AUTO: 0.02 K/UL (ref 0–0.04)
IMM GRANULOCYTES NFR BLD AUTO: 0.4 % (ref 0–0.5)
LDLC SERPL CALC-MCNC: 140 MG/DL (ref 63–159)
LYMPHOCYTES # BLD AUTO: 1.6 K/UL (ref 1–4.8)
LYMPHOCYTES NFR BLD: 32.8 % (ref 18–48)
MCH RBC QN AUTO: 28.5 PG (ref 27–31)
MCHC RBC AUTO-ENTMCNC: 32.9 G/DL (ref 32–36)
MCV RBC AUTO: 87 FL (ref 82–98)
MONOCYTES # BLD AUTO: 0.5 K/UL (ref 0.3–1)
MONOCYTES NFR BLD: 9.7 % (ref 4–15)
NEUTROPHILS # BLD AUTO: 2.7 K/UL (ref 1.8–7.7)
NEUTROPHILS NFR BLD: 55.1 % (ref 38–73)
NONHDLC SERPL-MCNC: 159 MG/DL
NRBC BLD-RTO: 0 /100 WBC
PLATELET # BLD AUTO: 224 K/UL (ref 150–450)
PMV BLD AUTO: 10.5 FL (ref 9.2–12.9)
POTASSIUM SERPL-SCNC: 4 MMOL/L (ref 3.5–5.1)
PROT SERPL-MCNC: 7.9 G/DL (ref 6–8.4)
RBC # BLD AUTO: 6 M/UL (ref 4.6–6.2)
SODIUM SERPL-SCNC: 139 MMOL/L (ref 136–145)
TRIGL SERPL-MCNC: 95 MG/DL (ref 30–150)
TSH SERPL DL<=0.005 MIU/L-ACNC: 0.81 UIU/ML (ref 0.4–4)
WBC # BLD AUTO: 4.97 K/UL (ref 3.9–12.7)

## 2021-11-19 PROCEDURE — 99395 PR PREVENTIVE VISIT,EST,18-39: ICD-10-PCS | Mod: S$GLB,,, | Performed by: STUDENT IN AN ORGANIZED HEALTH CARE EDUCATION/TRAINING PROGRAM

## 2021-11-19 PROCEDURE — 1159F PR MEDICATION LIST DOCUMENTED IN MEDICAL RECORD: ICD-10-PCS | Mod: CPTII,S$GLB,, | Performed by: STUDENT IN AN ORGANIZED HEALTH CARE EDUCATION/TRAINING PROGRAM

## 2021-11-19 PROCEDURE — 3074F PR MOST RECENT SYSTOLIC BLOOD PRESSURE < 130 MM HG: ICD-10-PCS | Mod: CPTII,S$GLB,, | Performed by: STUDENT IN AN ORGANIZED HEALTH CARE EDUCATION/TRAINING PROGRAM

## 2021-11-19 PROCEDURE — 80061 LIPID PANEL: CPT | Performed by: STUDENT IN AN ORGANIZED HEALTH CARE EDUCATION/TRAINING PROGRAM

## 2021-11-19 PROCEDURE — 36415 COLL VENOUS BLD VENIPUNCTURE: CPT | Mod: PO | Performed by: STUDENT IN AN ORGANIZED HEALTH CARE EDUCATION/TRAINING PROGRAM

## 2021-11-19 PROCEDURE — 1159F MED LIST DOCD IN RCRD: CPT | Mod: CPTII,S$GLB,, | Performed by: STUDENT IN AN ORGANIZED HEALTH CARE EDUCATION/TRAINING PROGRAM

## 2021-11-19 PROCEDURE — 3074F SYST BP LT 130 MM HG: CPT | Mod: CPTII,S$GLB,, | Performed by: STUDENT IN AN ORGANIZED HEALTH CARE EDUCATION/TRAINING PROGRAM

## 2021-11-19 PROCEDURE — 99999 PR PBB SHADOW E&M-EST. PATIENT-LVL IV: CPT | Mod: PBBFAC,,, | Performed by: STUDENT IN AN ORGANIZED HEALTH CARE EDUCATION/TRAINING PROGRAM

## 2021-11-19 PROCEDURE — 99395 PREV VISIT EST AGE 18-39: CPT | Mod: S$GLB,,, | Performed by: STUDENT IN AN ORGANIZED HEALTH CARE EDUCATION/TRAINING PROGRAM

## 2021-11-19 PROCEDURE — 82728 ASSAY OF FERRITIN: CPT | Performed by: STUDENT IN AN ORGANIZED HEALTH CARE EDUCATION/TRAINING PROGRAM

## 2021-11-19 PROCEDURE — 3079F DIAST BP 80-89 MM HG: CPT | Mod: CPTII,S$GLB,, | Performed by: STUDENT IN AN ORGANIZED HEALTH CARE EDUCATION/TRAINING PROGRAM

## 2021-11-19 PROCEDURE — 84443 ASSAY THYROID STIM HORMONE: CPT | Performed by: STUDENT IN AN ORGANIZED HEALTH CARE EDUCATION/TRAINING PROGRAM

## 2021-11-19 PROCEDURE — 3008F BODY MASS INDEX DOCD: CPT | Mod: CPTII,S$GLB,, | Performed by: STUDENT IN AN ORGANIZED HEALTH CARE EDUCATION/TRAINING PROGRAM

## 2021-11-19 PROCEDURE — 3008F PR BODY MASS INDEX (BMI) DOCUMENTED: ICD-10-PCS | Mod: CPTII,S$GLB,, | Performed by: STUDENT IN AN ORGANIZED HEALTH CARE EDUCATION/TRAINING PROGRAM

## 2021-11-19 PROCEDURE — 85025 COMPLETE CBC W/AUTO DIFF WBC: CPT | Performed by: STUDENT IN AN ORGANIZED HEALTH CARE EDUCATION/TRAINING PROGRAM

## 2021-11-19 PROCEDURE — 99999 PR PBB SHADOW E&M-EST. PATIENT-LVL IV: ICD-10-PCS | Mod: PBBFAC,,, | Performed by: STUDENT IN AN ORGANIZED HEALTH CARE EDUCATION/TRAINING PROGRAM

## 2021-11-19 PROCEDURE — 3079F PR MOST RECENT DIASTOLIC BLOOD PRESSURE 80-89 MM HG: ICD-10-PCS | Mod: CPTII,S$GLB,, | Performed by: STUDENT IN AN ORGANIZED HEALTH CARE EDUCATION/TRAINING PROGRAM

## 2021-11-19 PROCEDURE — 80053 COMPREHEN METABOLIC PANEL: CPT | Performed by: STUDENT IN AN ORGANIZED HEALTH CARE EDUCATION/TRAINING PROGRAM

## 2022-07-29 ENCOUNTER — OFFICE VISIT (OUTPATIENT)
Dept: FAMILY MEDICINE | Facility: CLINIC | Age: 27
End: 2022-07-29
Payer: COMMERCIAL

## 2022-07-29 VITALS
HEART RATE: 65 BPM | BODY MASS INDEX: 29.38 KG/M2 | OXYGEN SATURATION: 97 % | SYSTOLIC BLOOD PRESSURE: 124 MMHG | TEMPERATURE: 98 F | WEIGHT: 209.88 LBS | HEIGHT: 71 IN | DIASTOLIC BLOOD PRESSURE: 86 MMHG

## 2022-07-29 DIAGNOSIS — R59.0 LYMPHADENOPATHY, SUBMANDIBULAR: Primary | ICD-10-CM

## 2022-07-29 PROCEDURE — 3079F DIAST BP 80-89 MM HG: CPT | Mod: CPTII,S$GLB,, | Performed by: PHYSICIAN ASSISTANT

## 2022-07-29 PROCEDURE — 1159F MED LIST DOCD IN RCRD: CPT | Mod: CPTII,S$GLB,, | Performed by: PHYSICIAN ASSISTANT

## 2022-07-29 PROCEDURE — 99213 OFFICE O/P EST LOW 20 MIN: CPT | Mod: S$GLB,,, | Performed by: PHYSICIAN ASSISTANT

## 2022-07-29 PROCEDURE — 3079F PR MOST RECENT DIASTOLIC BLOOD PRESSURE 80-89 MM HG: ICD-10-PCS | Mod: CPTII,S$GLB,, | Performed by: PHYSICIAN ASSISTANT

## 2022-07-29 PROCEDURE — 3074F SYST BP LT 130 MM HG: CPT | Mod: CPTII,S$GLB,, | Performed by: PHYSICIAN ASSISTANT

## 2022-07-29 PROCEDURE — 99999 PR PBB SHADOW E&M-EST. PATIENT-LVL III: CPT | Mod: PBBFAC,,, | Performed by: PHYSICIAN ASSISTANT

## 2022-07-29 PROCEDURE — 99999 PR PBB SHADOW E&M-EST. PATIENT-LVL III: ICD-10-PCS | Mod: PBBFAC,,, | Performed by: PHYSICIAN ASSISTANT

## 2022-07-29 PROCEDURE — 3008F BODY MASS INDEX DOCD: CPT | Mod: CPTII,S$GLB,, | Performed by: PHYSICIAN ASSISTANT

## 2022-07-29 PROCEDURE — 1159F PR MEDICATION LIST DOCUMENTED IN MEDICAL RECORD: ICD-10-PCS | Mod: CPTII,S$GLB,, | Performed by: PHYSICIAN ASSISTANT

## 2022-07-29 PROCEDURE — 3008F PR BODY MASS INDEX (BMI) DOCUMENTED: ICD-10-PCS | Mod: CPTII,S$GLB,, | Performed by: PHYSICIAN ASSISTANT

## 2022-07-29 PROCEDURE — 3074F PR MOST RECENT SYSTOLIC BLOOD PRESSURE < 130 MM HG: ICD-10-PCS | Mod: CPTII,S$GLB,, | Performed by: PHYSICIAN ASSISTANT

## 2022-07-29 PROCEDURE — 99213 PR OFFICE/OUTPT VISIT, EST, LEVL III, 20-29 MIN: ICD-10-PCS | Mod: S$GLB,,, | Performed by: PHYSICIAN ASSISTANT

## 2022-07-29 NOTE — PROGRESS NOTES
"Subjective:       Patient ID: Torsten Delacruz Jr. is a 26 y.o. male.    Chief Complaint: No chief complaint on file.    Torsten Delacruz Jr. Is a 26 y.o. male who presents to the clinic with complaints of a "lump" on his throat x 2-3 days. Denies any pain, difficulty swallowing, throat pain, fevers, chills. No recent illness or vaccinations.     Review of Systems   Constitutional: Negative for chills and fever.   HENT: Negative for rhinorrhea, sinus pressure, sinus pain, sore throat, trouble swallowing and voice change.    Respiratory: Negative for cough and shortness of breath.    Cardiovascular: Negative for chest pain and palpitations.       Objective:      Physical Exam  Vitals and nursing note reviewed.   Constitutional:       Appearance: Normal appearance.   HENT:      Head: Normocephalic and atraumatic.      Nose: Nose normal.      Mouth/Throat:      Mouth: Mucous membranes are moist.      Pharynx: Oropharynx is clear.   Neck:      Thyroid: No thyroid mass or thyroid tenderness.      Trachea: Trachea normal.      Comments: Swollen, non tender right submandibular lymph node   Cardiovascular:      Rate and Rhythm: Normal rate and regular rhythm.      Pulses: Normal pulses.      Heart sounds: Normal heart sounds.   Pulmonary:      Effort: Pulmonary effort is normal.      Breath sounds: Normal breath sounds.   Abdominal:      General: Abdomen is flat.      Palpations: Abdomen is soft.   Musculoskeletal:      Cervical back: Neck supple.   Skin:     General: Skin is warm.   Neurological:      General: No focal deficit present.      Mental Status: He is alert and oriented to person, place, and time.   Psychiatric:         Mood and Affect: Mood normal.         Behavior: Behavior normal.         Assessment:       1. Lymphadenopathy, submandibular        Plan:       Diagnoses and all orders for this visit:    Lymphadenopathy, submandibular  - Can take NSAIDs as needed for inflammation  - Return or seek care for " "worsening or worrisome symptoms   - Follow up in 2 weeks if not resolved      Documentation entered by me for this encounter may have been done in part using speech-recognition technology. Although I have made an effort to ensure accuracy, "sound like" errors may exist and should be interpreted in context.   I spent a total of 20 minutes on the day of the visit.This includes face to face time and non-face to face time preparing to see the patient (eg, review of tests), obtaining and/or reviewing separately obtained history, documenting clinical information in the electronic or other health record, independently interpreting results and communicating results to the patient/family/caregiver, or care coordinator.    Tasha ROGERS  patient was seen and examined by me and I agree with HPI, ROS, PE as well as  assessment and plan       "

## 2022-08-12 ENCOUNTER — OFFICE VISIT (OUTPATIENT)
Dept: FAMILY MEDICINE | Facility: CLINIC | Age: 27
End: 2022-08-12
Payer: COMMERCIAL

## 2022-08-12 ENCOUNTER — HOSPITAL ENCOUNTER (OUTPATIENT)
Dept: RADIOLOGY | Facility: HOSPITAL | Age: 27
Discharge: HOME | End: 2022-08-12
Attending: PHYSICIAN ASSISTANT

## 2022-08-12 VITALS
HEIGHT: 71 IN | DIASTOLIC BLOOD PRESSURE: 74 MMHG | WEIGHT: 208.75 LBS | OXYGEN SATURATION: 98 % | SYSTOLIC BLOOD PRESSURE: 122 MMHG | HEART RATE: 70 BPM | RESPIRATION RATE: 17 BRPM | TEMPERATURE: 98 F | BODY MASS INDEX: 29.23 KG/M2

## 2022-08-12 DIAGNOSIS — R59.0 LAD (LYMPHADENOPATHY), SUBMENTAL: Primary | ICD-10-CM

## 2022-08-12 DIAGNOSIS — R59.0 LAD (LYMPHADENOPATHY), SUBMENTAL: ICD-10-CM

## 2022-08-12 PROCEDURE — 99213 OFFICE O/P EST LOW 20 MIN: CPT | Mod: S$GLB,,, | Performed by: PHYSICIAN ASSISTANT

## 2022-08-12 PROCEDURE — 3008F PR BODY MASS INDEX (BMI) DOCUMENTED: ICD-10-PCS | Mod: CPTII,S$GLB,, | Performed by: PHYSICIAN ASSISTANT

## 2022-08-12 PROCEDURE — 99999 PR PBB SHADOW E&M-EST. PATIENT-LVL III: ICD-10-PCS | Mod: PBBFAC,,, | Performed by: PHYSICIAN ASSISTANT

## 2022-08-12 PROCEDURE — 3078F DIAST BP <80 MM HG: CPT | Mod: CPTII,S$GLB,, | Performed by: PHYSICIAN ASSISTANT

## 2022-08-12 PROCEDURE — 3078F PR MOST RECENT DIASTOLIC BLOOD PRESSURE < 80 MM HG: ICD-10-PCS | Mod: CPTII,S$GLB,, | Performed by: PHYSICIAN ASSISTANT

## 2022-08-12 PROCEDURE — 76536 US EXAM OF HEAD AND NECK: CPT | Mod: TC

## 2022-08-12 PROCEDURE — 3074F PR MOST RECENT SYSTOLIC BLOOD PRESSURE < 130 MM HG: ICD-10-PCS | Mod: CPTII,S$GLB,, | Performed by: PHYSICIAN ASSISTANT

## 2022-08-12 PROCEDURE — 1159F PR MEDICATION LIST DOCUMENTED IN MEDICAL RECORD: ICD-10-PCS | Mod: CPTII,S$GLB,, | Performed by: PHYSICIAN ASSISTANT

## 2022-08-12 PROCEDURE — 99999 PR PBB SHADOW E&M-EST. PATIENT-LVL III: CPT | Mod: PBBFAC,,, | Performed by: PHYSICIAN ASSISTANT

## 2022-08-12 PROCEDURE — 76536 US SOFT TISSUE HEAD NECK THYROID: ICD-10-PCS | Mod: 26,,, | Performed by: RADIOLOGY

## 2022-08-12 PROCEDURE — 1159F MED LIST DOCD IN RCRD: CPT | Mod: CPTII,S$GLB,, | Performed by: PHYSICIAN ASSISTANT

## 2022-08-12 PROCEDURE — 3074F SYST BP LT 130 MM HG: CPT | Mod: CPTII,S$GLB,, | Performed by: PHYSICIAN ASSISTANT

## 2022-08-12 PROCEDURE — 99213 PR OFFICE/OUTPT VISIT, EST, LEVL III, 20-29 MIN: ICD-10-PCS | Mod: S$GLB,,, | Performed by: PHYSICIAN ASSISTANT

## 2022-08-12 PROCEDURE — 76536 US EXAM OF HEAD AND NECK: CPT | Mod: 26,,, | Performed by: RADIOLOGY

## 2022-08-12 PROCEDURE — 3008F BODY MASS INDEX DOCD: CPT | Mod: CPTII,S$GLB,, | Performed by: PHYSICIAN ASSISTANT

## 2022-08-12 NOTE — PROGRESS NOTES
"Subjective:       Patient ID: Torsten Delacruz Jr. is a 26 y.o. male.    Chief Complaint: Follow-up (2 week f/u )    Torsten Delacruz Jr. Is 26 y.o. male who presents to the clinic for a 2 week follow up. He was seen in the clinic for a swollen submandibular lymph node. He states that the swelling has improved a little bit but is still present. He denies pain, trouble swallowing, or pain.     Review of Systems   Constitutional: Negative for chills and fever.   HENT: Negative for trouble swallowing and voice change.    Respiratory: Negative for cough and shortness of breath.    Cardiovascular: Negative for chest pain and palpitations.       Objective:      Physical Exam  Vitals and nursing note reviewed.   Constitutional:       Appearance: Normal appearance.   HENT:      Head: Normocephalic and atraumatic.   Neck:      Comments: Swollen, nontender mobile lymphnode  Cardiovascular:      Rate and Rhythm: Normal rate and regular rhythm.      Pulses: Normal pulses.      Heart sounds: Normal heart sounds.   Pulmonary:      Effort: Pulmonary effort is normal.      Breath sounds: Normal breath sounds.   Skin:     General: Skin is warm.      Capillary Refill: Capillary refill takes less than 2 seconds.   Neurological:      General: No focal deficit present.      Mental Status: He is alert and oriented to person, place, and time.   Psychiatric:         Mood and Affect: Mood normal.         Behavior: Behavior normal.         Assessment:       1. LAD (lymphadenopathy), submental        Plan:       Torsten was seen today for follow-up.    Diagnoses and all orders for this visit:    LAD (lymphadenopathy), submental  -     US Soft Tissue Head Neck Thyroid; Future           Follow up for us soon,  .       Documentation entered by me for this encounter may have been done in part using speech-recognition technology. Although I have made an effort to ensure accuracy, "sound like" errors may exist and should be interpreted in context.   I " spent a total of 20 minutes on the day of the visit.This includes face to face time and non-face to face time preparing to see the patient (eg, review of tests), obtaining and/or reviewing separately obtained history, documenting clinical information in the electronic or other health record, independently interpreting results and communicating results to the patient/family/caregiver, or care coordinator.    Tasha ROGERS  patient was seen and examined by me and I agree with HPI, ROS, PE as well as  assessment and plan

## 2022-08-14 ENCOUNTER — PATIENT MESSAGE (OUTPATIENT)
Dept: FAMILY MEDICINE | Facility: CLINIC | Age: 27
End: 2022-08-14

## 2022-08-15 DIAGNOSIS — R59.0 LAD (LYMPHADENOPATHY), SUBMENTAL: Primary | ICD-10-CM

## 2023-01-25 ENCOUNTER — OFFICE VISIT (OUTPATIENT)
Dept: FAMILY MEDICINE | Facility: CLINIC | Age: 28
End: 2023-01-25
Payer: COMMERCIAL

## 2023-01-25 ENCOUNTER — LAB VISIT (OUTPATIENT)
Dept: LAB | Facility: HOSPITAL | Age: 28
End: 2023-01-25
Attending: PHYSICIAN ASSISTANT
Payer: COMMERCIAL

## 2023-01-25 VITALS
HEIGHT: 71 IN | OXYGEN SATURATION: 97 % | WEIGHT: 212.75 LBS | BODY MASS INDEX: 29.78 KG/M2 | TEMPERATURE: 98 F | DIASTOLIC BLOOD PRESSURE: 70 MMHG | HEART RATE: 78 BPM | SYSTOLIC BLOOD PRESSURE: 124 MMHG

## 2023-01-25 DIAGNOSIS — N50.82 SCROTAL PAIN: ICD-10-CM

## 2023-01-25 DIAGNOSIS — Z00.00 HEALTHCARE MAINTENANCE: Primary | ICD-10-CM

## 2023-01-25 DIAGNOSIS — Z00.00 HEALTHCARE MAINTENANCE: ICD-10-CM

## 2023-01-25 LAB
ALBUMIN SERPL BCP-MCNC: 4.7 G/DL (ref 3.5–5.2)
ALP SERPL-CCNC: 57 U/L (ref 55–135)
ALT SERPL W/O P-5'-P-CCNC: 22 U/L (ref 10–44)
ANION GAP SERPL CALC-SCNC: 14 MMOL/L (ref 8–16)
AST SERPL-CCNC: 22 U/L (ref 10–40)
BASOPHILS # BLD AUTO: 0.03 K/UL (ref 0–0.2)
BASOPHILS NFR BLD: 0.5 % (ref 0–1.9)
BILIRUB SERPL-MCNC: 1.3 MG/DL (ref 0.1–1)
BUN SERPL-MCNC: 15 MG/DL (ref 6–20)
CALCIUM SERPL-MCNC: 10.1 MG/DL (ref 8.7–10.5)
CHLORIDE SERPL-SCNC: 103 MMOL/L (ref 95–110)
CHOLEST SERPL-MCNC: 211 MG/DL (ref 120–199)
CHOLEST/HDLC SERPL: 4.2 {RATIO} (ref 2–5)
CO2 SERPL-SCNC: 22 MMOL/L (ref 23–29)
CREAT SERPL-MCNC: 1 MG/DL (ref 0.5–1.4)
DIFFERENTIAL METHOD: NORMAL
EOSINOPHIL # BLD AUTO: 0.1 K/UL (ref 0–0.5)
EOSINOPHIL NFR BLD: 1.3 % (ref 0–8)
ERYTHROCYTE [DISTWIDTH] IN BLOOD BY AUTOMATED COUNT: 12.1 % (ref 11.5–14.5)
EST. GFR  (NO RACE VARIABLE): >60 ML/MIN/1.73 M^2
GLUCOSE SERPL-MCNC: 81 MG/DL (ref 70–110)
HAV IGM SERPL QL IA: NORMAL
HBV CORE IGM SERPL QL IA: NORMAL
HBV SURFACE AG SERPL QL IA: NORMAL
HCT VFR BLD AUTO: 51.4 % (ref 40–54)
HCV AB SERPL QL IA: NORMAL
HDLC SERPL-MCNC: 50 MG/DL (ref 40–75)
HDLC SERPL: 23.7 % (ref 20–50)
HGB BLD-MCNC: 17.2 G/DL (ref 14–18)
IMM GRANULOCYTES # BLD AUTO: 0.01 K/UL (ref 0–0.04)
IMM GRANULOCYTES NFR BLD AUTO: 0.2 % (ref 0–0.5)
LDLC SERPL CALC-MCNC: 143 MG/DL (ref 63–159)
LYMPHOCYTES # BLD AUTO: 1.8 K/UL (ref 1–4.8)
LYMPHOCYTES NFR BLD: 28.8 % (ref 18–48)
MCH RBC QN AUTO: 28.7 PG (ref 27–31)
MCHC RBC AUTO-ENTMCNC: 33.5 G/DL (ref 32–36)
MCV RBC AUTO: 86 FL (ref 82–98)
MONOCYTES # BLD AUTO: 0.6 K/UL (ref 0.3–1)
MONOCYTES NFR BLD: 8.9 % (ref 4–15)
NEUTROPHILS # BLD AUTO: 3.8 K/UL (ref 1.8–7.7)
NEUTROPHILS NFR BLD: 60.3 % (ref 38–73)
NONHDLC SERPL-MCNC: 161 MG/DL
NRBC BLD-RTO: 0 /100 WBC
PLATELET # BLD AUTO: 243 K/UL (ref 150–450)
PMV BLD AUTO: 10.3 FL (ref 9.2–12.9)
POTASSIUM SERPL-SCNC: 3.8 MMOL/L (ref 3.5–5.1)
PROT SERPL-MCNC: 8.2 G/DL (ref 6–8.4)
RBC # BLD AUTO: 5.99 M/UL (ref 4.6–6.2)
SODIUM SERPL-SCNC: 139 MMOL/L (ref 136–145)
TRIGL SERPL-MCNC: 90 MG/DL (ref 30–150)
TSH SERPL DL<=0.005 MIU/L-ACNC: 1.58 UIU/ML (ref 0.4–4)
WBC # BLD AUTO: 6.21 K/UL (ref 3.9–12.7)

## 2023-01-25 PROCEDURE — 3008F PR BODY MASS INDEX (BMI) DOCUMENTED: ICD-10-PCS | Mod: CPTII,S$GLB,, | Performed by: PHYSICIAN ASSISTANT

## 2023-01-25 PROCEDURE — 86592 SYPHILIS TEST NON-TREP QUAL: CPT | Performed by: PHYSICIAN ASSISTANT

## 2023-01-25 PROCEDURE — 36415 COLL VENOUS BLD VENIPUNCTURE: CPT | Mod: PO | Performed by: PHYSICIAN ASSISTANT

## 2023-01-25 PROCEDURE — 3008F BODY MASS INDEX DOCD: CPT | Mod: CPTII,S$GLB,, | Performed by: PHYSICIAN ASSISTANT

## 2023-01-25 PROCEDURE — 3074F SYST BP LT 130 MM HG: CPT | Mod: CPTII,S$GLB,, | Performed by: PHYSICIAN ASSISTANT

## 2023-01-25 PROCEDURE — 80053 COMPREHEN METABOLIC PANEL: CPT | Performed by: PHYSICIAN ASSISTANT

## 2023-01-25 PROCEDURE — 3078F PR MOST RECENT DIASTOLIC BLOOD PRESSURE < 80 MM HG: ICD-10-PCS | Mod: CPTII,S$GLB,, | Performed by: PHYSICIAN ASSISTANT

## 2023-01-25 PROCEDURE — 99395 PR PREVENTIVE VISIT,EST,18-39: ICD-10-PCS | Mod: S$GLB,,, | Performed by: PHYSICIAN ASSISTANT

## 2023-01-25 PROCEDURE — 3078F DIAST BP <80 MM HG: CPT | Mod: CPTII,S$GLB,, | Performed by: PHYSICIAN ASSISTANT

## 2023-01-25 PROCEDURE — 3074F PR MOST RECENT SYSTOLIC BLOOD PRESSURE < 130 MM HG: ICD-10-PCS | Mod: CPTII,S$GLB,, | Performed by: PHYSICIAN ASSISTANT

## 2023-01-25 PROCEDURE — 85025 COMPLETE CBC W/AUTO DIFF WBC: CPT | Performed by: PHYSICIAN ASSISTANT

## 2023-01-25 PROCEDURE — 80074 ACUTE HEPATITIS PANEL: CPT | Performed by: PHYSICIAN ASSISTANT

## 2023-01-25 PROCEDURE — 99999 PR PBB SHADOW E&M-EST. PATIENT-LVL III: ICD-10-PCS | Mod: PBBFAC,,, | Performed by: PHYSICIAN ASSISTANT

## 2023-01-25 PROCEDURE — 87389 HIV-1 AG W/HIV-1&-2 AB AG IA: CPT | Performed by: PHYSICIAN ASSISTANT

## 2023-01-25 PROCEDURE — 80061 LIPID PANEL: CPT | Performed by: PHYSICIAN ASSISTANT

## 2023-01-25 PROCEDURE — 1159F MED LIST DOCD IN RCRD: CPT | Mod: CPTII,S$GLB,, | Performed by: PHYSICIAN ASSISTANT

## 2023-01-25 PROCEDURE — 99999 PR PBB SHADOW E&M-EST. PATIENT-LVL III: CPT | Mod: PBBFAC,,, | Performed by: PHYSICIAN ASSISTANT

## 2023-01-25 PROCEDURE — 1159F PR MEDICATION LIST DOCUMENTED IN MEDICAL RECORD: ICD-10-PCS | Mod: CPTII,S$GLB,, | Performed by: PHYSICIAN ASSISTANT

## 2023-01-25 PROCEDURE — 84443 ASSAY THYROID STIM HORMONE: CPT | Performed by: PHYSICIAN ASSISTANT

## 2023-01-25 PROCEDURE — 99395 PREV VISIT EST AGE 18-39: CPT | Mod: S$GLB,,, | Performed by: PHYSICIAN ASSISTANT

## 2023-01-25 NOTE — PROGRESS NOTES
Subjective:       Patient ID: Torsten Delacruz Jr. is a 27 y.o. male.    Chief Complaint: Annual Exam    Patient with no chronic medical issues presents for routine annual exam.  He has had intermittent episodes of scrotal pain for the last 2 weeks.  Pain occurs while lying down.  Pain is sharp and severe and last only several seconds.  He denies any swelling or skin changes.  He denies any bowel or urinary changes.  He states that he will see a cardiologist next week for evaluation of palpitations.  Patients patient medical/surgical, social and family histories have been reviewed       Review of Systems   Constitutional:  Negative for activity change, appetite change, fatigue and unexpected weight change.   Respiratory:  Negative for cough, chest tightness and shortness of breath.    Cardiovascular:  Positive for palpitations. Negative for chest pain and leg swelling.   Gastrointestinal:  Negative for abdominal pain, anal bleeding, blood in stool, constipation, diarrhea and nausea.   Endocrine: Negative for polydipsia and polyuria.   Genitourinary:  Positive for testicular pain. Negative for decreased urine volume, difficulty urinating, dysuria, flank pain, frequency, genital sores, hematuria, penile discharge, scrotal swelling and urgency.   Musculoskeletal:  Negative for arthralgias.   Skin:  Negative for rash.   Neurological:  Negative for dizziness and headaches.   Psychiatric/Behavioral:  Negative for dysphoric mood. The patient is not nervous/anxious.      Objective:      Physical Exam  Exam conducted with a chaperone present.   Constitutional:       General: He is not in acute distress.     Appearance: Normal appearance. He is well-developed. He is not ill-appearing.   HENT:      Head: Normocephalic and atraumatic.      Right Ear: Tympanic membrane, ear canal and external ear normal.      Left Ear: Tympanic membrane, ear canal and external ear normal.   Eyes:      Conjunctiva/sclera: Conjunctivae normal.  "  Cardiovascular:      Rate and Rhythm: Normal rate and regular rhythm.      Heart sounds: Normal heart sounds.   Pulmonary:      Effort: Pulmonary effort is normal.      Breath sounds: Normal breath sounds.   Abdominal:      Hernia: There is no hernia in the left inguinal area or right inguinal area.   Genitourinary:     Penis: Circumcised.       Testes: Normal.         Right: Mass, tenderness or swelling not present.         Left: Mass, tenderness or swelling not present.      Epididymis:      Right: Normal.      Left: Normal.   Musculoskeletal:      Cervical back: Neck supple. No tenderness.      Right lower leg: No edema.      Left lower leg: No edema.   Lymphadenopathy:      Lower Body: No right inguinal adenopathy. No left inguinal adenopathy.   Skin:     General: Skin is warm and dry.      Capillary Refill: Capillary refill takes less than 2 seconds.   Neurological:      General: No focal deficit present.      Mental Status: He is alert.   Psychiatric:         Mood and Affect: Mood normal.         Behavior: Behavior normal. Behavior is cooperative.         Judgment: Judgment normal.       Assessment:       1. Healthcare maintenance    2. Scrotal pain          Plan:       Torsten was seen today for annual exam.    Diagnoses and all orders for this visit:    Healthcare maintenance  -     CBC Auto Differential; Future  -     Comprehensive Metabolic Panel; Future  -     Lipid Panel; Future  -     HIV 1/2 Ag/Ab (4th Gen); Future  -     RPR; Future  -     C. trachomatis/N. gonorrhoeae by AMP DNA [facility]; Urine; Future  -     HEPATITIS PANEL, ACUTE; Future  -     TSH; Future    Scrotal pain  -     US Scrotum And Testicles; Future     Further recommendations will be made based on results         Follow up for lab today NF ok, us soon .           Documentation entered by me for this encounter may have been done in part using speech-recognition technology. Although I have made an effort to ensure accuracy, "sound like" " errors may exist and should be interpreted in context.

## 2023-01-26 LAB
HIV 1+2 AB+HIV1 P24 AG SERPL QL IA: NORMAL
RPR SER QL: NORMAL

## 2023-02-01 ENCOUNTER — HOSPITAL ENCOUNTER (OUTPATIENT)
Dept: RADIOLOGY | Facility: HOSPITAL | Age: 28
Discharge: HOME | End: 2023-02-01
Attending: PHYSICIAN ASSISTANT
Payer: COMMERCIAL

## 2023-02-01 DIAGNOSIS — N50.82 SCROTAL PAIN: Primary | ICD-10-CM

## 2023-02-01 DIAGNOSIS — N50.82 SCROTAL PAIN: ICD-10-CM

## 2023-02-01 PROCEDURE — 76870 US EXAM SCROTUM: CPT | Mod: TC

## 2023-02-01 PROCEDURE — 76870 US EXAM SCROTUM: CPT | Mod: 26,,, | Performed by: RADIOLOGY

## 2023-02-01 PROCEDURE — 76870 US SCROTUM AND TESTICLES: ICD-10-PCS | Mod: 26,,, | Performed by: RADIOLOGY

## 2023-02-02 ENCOUNTER — OFFICE VISIT (OUTPATIENT)
Dept: UROLOGY | Facility: CLINIC | Age: 28
End: 2023-02-02
Payer: COMMERCIAL

## 2023-02-02 VITALS — DIASTOLIC BLOOD PRESSURE: 91 MMHG | HEART RATE: 70 BPM | SYSTOLIC BLOOD PRESSURE: 129 MMHG

## 2023-02-02 DIAGNOSIS — N50.819 PAIN IN TESTICLE, UNSPECIFIED LATERALITY: ICD-10-CM

## 2023-02-02 PROCEDURE — 3080F DIAST BP >= 90 MM HG: CPT | Mod: CPTII,S$GLB,, | Performed by: NURSE PRACTITIONER

## 2023-02-02 PROCEDURE — 3074F PR MOST RECENT SYSTOLIC BLOOD PRESSURE < 130 MM HG: ICD-10-PCS | Mod: CPTII,S$GLB,, | Performed by: NURSE PRACTITIONER

## 2023-02-02 PROCEDURE — 99999 PR PBB SHADOW E&M-EST. PATIENT-LVL III: CPT | Mod: PBBFAC,,, | Performed by: NURSE PRACTITIONER

## 2023-02-02 PROCEDURE — 1160F PR REVIEW ALL MEDS BY PRESCRIBER/CLIN PHARMACIST DOCUMENTED: ICD-10-PCS | Mod: CPTII,S$GLB,, | Performed by: NURSE PRACTITIONER

## 2023-02-02 PROCEDURE — 99999 PR PBB SHADOW E&M-EST. PATIENT-LVL III: ICD-10-PCS | Mod: PBBFAC,,, | Performed by: NURSE PRACTITIONER

## 2023-02-02 PROCEDURE — 1159F MED LIST DOCD IN RCRD: CPT | Mod: CPTII,S$GLB,, | Performed by: NURSE PRACTITIONER

## 2023-02-02 PROCEDURE — 1160F RVW MEDS BY RX/DR IN RCRD: CPT | Mod: CPTII,S$GLB,, | Performed by: NURSE PRACTITIONER

## 2023-02-02 PROCEDURE — 99204 OFFICE O/P NEW MOD 45 MIN: CPT | Mod: S$GLB,,, | Performed by: NURSE PRACTITIONER

## 2023-02-02 PROCEDURE — 3074F SYST BP LT 130 MM HG: CPT | Mod: CPTII,S$GLB,, | Performed by: NURSE PRACTITIONER

## 2023-02-02 PROCEDURE — 99204 PR OFFICE/OUTPT VISIT, NEW, LEVL IV, 45-59 MIN: ICD-10-PCS | Mod: S$GLB,,, | Performed by: NURSE PRACTITIONER

## 2023-02-02 PROCEDURE — 1159F PR MEDICATION LIST DOCUMENTED IN MEDICAL RECORD: ICD-10-PCS | Mod: CPTII,S$GLB,, | Performed by: NURSE PRACTITIONER

## 2023-02-02 PROCEDURE — 3080F PR MOST RECENT DIASTOLIC BLOOD PRESSURE >= 90 MM HG: ICD-10-PCS | Mod: CPTII,S$GLB,, | Performed by: NURSE PRACTITIONER

## 2023-02-02 RX ORDER — DOXYCYCLINE 100 MG/1
100 CAPSULE ORAL 2 TIMES DAILY
Qty: 28 CAPSULE | Refills: 0 | Status: SHIPPED | OUTPATIENT
Start: 2023-02-02 | End: 2023-02-16

## 2023-02-02 RX ORDER — IBUPROFEN 800 MG/1
800 TABLET ORAL 2 TIMES DAILY WITH MEALS
Qty: 28 TABLET | Refills: 0 | Status: SHIPPED | OUTPATIENT
Start: 2023-02-02 | End: 2023-02-16

## 2023-02-02 NOTE — LETTER
February 2, 2023      [facility]  [address] [place] [address] [place] [address]  Phone: [phone]  Fax: [phone]       Patient: Torsten Delacruz   YOB: 1995  Date of Visit: 02/02/2023    To Whom It May Concern:    Clare Delacruz  was at [facility] on 02/02/2023. The patient may return to work 2/2/23 with restrictions. No heavy lifting for 2 weeks. If you have any questions or concerns, or if I can be of further assistance, please do not hesitate to contact me.    Sincerely,      Jess Sepulveda, NP

## 2023-02-02 NOTE — PATIENT INSTRUCTIONS
Recommend supportive management with NSAIDs (ibuprofen as prescribed), scrotal support (e.g., jock strap), ice pack, scrotal elevation when lying down. Patient instructed to avoid any strenuous activity or heavy lifting until pain resolves. Informed patient that he should return to the emergency department immediately should he experience fever or worsening pain and swelling.

## 2023-02-02 NOTE — PROGRESS NOTES
CHIEF COMPLAINT:    Mr. Delacruz is a 27 y.o. male presenting for testicle pain.  PRESENTING ILLNESS:    Torsten Delacruz Jr. is a 27 y.o. male who presents for testicle pain. This is his initial clinic visit.    2/2/23  Patient presents to clinic for bilateral testicle pain for the past 2-3 weeks. He reports pain radiates to bilateral groin. He initially had mild scrotal swelling that has resolved. He reports pain is intermittent and worsens with ejaculation. Denies penile pain or discharge. Denies dysuria, gross hematuria, flank pain, fever, chills, nausea or vomiting. He reports a strong urinary stream and complete bladder emptying. Denies urinary frequency or urgency. Denies any  trauma.    1/25/23 G/C not detected    2/1/23 Scrotal US  Right Testicle:   *Size: 5.8 x 3.4 x 3.4 cm  *Appearance: Normal.  *Flow: Normal arterial and venous flow  *Epididymis: Normal.  *Hydrocele: None.  *Varicocele: None.   Left Testicle:   *Size: 5.3 x 3.3 x 3.1 cm  *Appearance: Normal.  *Flow: Normal arterial and venous flow  *Epididymis: Normal.  *Hydrocele: Trace  *Varicocele: None.   Other findings: None.   Impression:   No significant abnormality.    REVIEW OF SYSTEMS:    Review of Systems    Constitutional: Negative for fever and chills.   HENT: Negative for hearing loss.   Eyes: Negative for visual disturbance.   Respiratory: Negative for shortness of breath.   Cardiovascular: Negative for chest pain.   Gastrointestinal: Negative for nausea, vomiting, and constipation.   Genitourinary:  See above  Neurological: Negative for dizziness.   Hematological: Does not bruise/bleed easily.   Psychiatric/Behavioral: Negative for confusion.       PATIENT HISTORY:    Past Medical History:   Diagnosis Date    Bronchitis     once as a child    Hyperlipidemia        Past Surgical History:   Procedure Laterality Date    CIRCUMCISION         Family History   Problem Relation Age of Onset    No Known Problems Mother     Diabetes Father         Social History     Socioeconomic History    Marital status: Single   Tobacco Use    Smoking status: Never    Smokeless tobacco: Never   Substance and Sexual Activity    Alcohol use: No    Drug use: No    Sexual activity: Yes     Partners: Female       Allergies:  Patient has no known allergies.    Medications:    Current Outpatient Medications:     doxycycline (VIBRAMYCIN) 100 MG Cap, Take 1 capsule (100 mg total) by mouth 2 (two) times daily. for 14 days, Disp: 28 capsule, Rfl: 0    ibuprofen (ADVIL,MOTRIN) 800 MG tablet, Take 1 tablet (800 mg total) by mouth 2 (two) times daily with meals. for 14 days, Disp: 28 tablet, Rfl: 0    PHYSICAL EXAMINATION:    Constitutional: He is oriented to person, place, and time. He appears well-developed and well-nourished.  He is in no apparent distress.    Neck: Normal ROM.     Cardiovascular: Normal rate.      Pulmonary/Chest: Effort normal. No respiratory distress.     Abdominal:  He exhibits no distension.  There is no CVA tenderness.     Neurological: He is alert and oriented to person, place, and time.     Skin: Skin is warm and dry.     Psych: Cooperative with normal affect.    Genitourinary: The penis is circumcised. The urethral meatus is normal. The testes, epididymides, and cord structures are normal in size and contour bilaterally. The scrotum is normal in size and contour.        Physical Exam      LABS:      No results found for: PSA, PSADIAG, PSATOTAL, PSAFREE, PSAFREEPCT  Lab Results   Component Value Date    CREATININE 1.0 01/25/2023         IMPRESSION:    Encounter Diagnoses   Name Primary?    Pain in testicle, unspecified laterality          PLAN:  -Recommend supportive management with NSAIDs as prescribed, scrotal support (e.g., jock strap), ice pack, scrotal elevation when lying down. Patient instructed to avoid any strenuous activity or heavy lifting until pain resolves. Informed patient that he should return to the emergency department immediately  should he experience fever or worsening pain and swelling.   Discussed side effects, indications, and MOA for ibuprofen. Prescription sent to the pharmacy. Pt verbalized understanding.  Discussed side effects, indications, and MOA for doxycycline. Prescription sent to the pharmacy. Pt verbalized understanding.  -Send message in portal in 2 weeks after completing medications with update  -RTC 4 weeks      I encouraged him or any of his family members to call or email me with questions and/or concerns.      45 minutes of total time spent on the encounter, which includes face to face time and non-face to face time preparing to see the patient (eg, review of tests), Obtaining and/or reviewing separately obtained history, Documenting clinical information in the electronic or other health record, Independently interpreting results (not separately reported) and communicating results to the patient/family/caregiver, or Care coordination (not separately reported).

## 2023-02-17 ENCOUNTER — OFFICE VISIT (OUTPATIENT)
Dept: FAMILY MEDICINE | Facility: CLINIC | Age: 28
End: 2023-02-17
Payer: COMMERCIAL

## 2023-02-17 VITALS
HEIGHT: 71 IN | BODY MASS INDEX: 28.98 KG/M2 | TEMPERATURE: 98 F | SYSTOLIC BLOOD PRESSURE: 134 MMHG | DIASTOLIC BLOOD PRESSURE: 86 MMHG | HEART RATE: 83 BPM | OXYGEN SATURATION: 99 % | WEIGHT: 207 LBS

## 2023-02-17 DIAGNOSIS — K52.9 ENTERITIS: ICD-10-CM

## 2023-02-17 DIAGNOSIS — K29.70 GASTRITIS, PRESENCE OF BLEEDING UNSPECIFIED, UNSPECIFIED CHRONICITY, UNSPECIFIED GASTRITIS TYPE: Primary | ICD-10-CM

## 2023-02-17 PROCEDURE — 1159F PR MEDICATION LIST DOCUMENTED IN MEDICAL RECORD: ICD-10-PCS | Mod: CPTII,S$GLB,, | Performed by: PHYSICIAN ASSISTANT

## 2023-02-17 PROCEDURE — 99213 OFFICE O/P EST LOW 20 MIN: CPT | Mod: S$GLB,,, | Performed by: PHYSICIAN ASSISTANT

## 2023-02-17 PROCEDURE — 3079F DIAST BP 80-89 MM HG: CPT | Mod: CPTII,S$GLB,, | Performed by: PHYSICIAN ASSISTANT

## 2023-02-17 PROCEDURE — 3008F PR BODY MASS INDEX (BMI) DOCUMENTED: ICD-10-PCS | Mod: CPTII,S$GLB,, | Performed by: PHYSICIAN ASSISTANT

## 2023-02-17 PROCEDURE — 3079F PR MOST RECENT DIASTOLIC BLOOD PRESSURE 80-89 MM HG: ICD-10-PCS | Mod: CPTII,S$GLB,, | Performed by: PHYSICIAN ASSISTANT

## 2023-02-17 PROCEDURE — 3075F SYST BP GE 130 - 139MM HG: CPT | Mod: CPTII,S$GLB,, | Performed by: PHYSICIAN ASSISTANT

## 2023-02-17 PROCEDURE — 3008F BODY MASS INDEX DOCD: CPT | Mod: CPTII,S$GLB,, | Performed by: PHYSICIAN ASSISTANT

## 2023-02-17 PROCEDURE — 99213 PR OFFICE/OUTPT VISIT, EST, LEVL III, 20-29 MIN: ICD-10-PCS | Mod: S$GLB,,, | Performed by: PHYSICIAN ASSISTANT

## 2023-02-17 PROCEDURE — 1159F MED LIST DOCD IN RCRD: CPT | Mod: CPTII,S$GLB,, | Performed by: PHYSICIAN ASSISTANT

## 2023-02-17 PROCEDURE — 99999 PR PBB SHADOW E&M-EST. PATIENT-LVL III: CPT | Mod: PBBFAC,,, | Performed by: PHYSICIAN ASSISTANT

## 2023-02-17 PROCEDURE — 3075F PR MOST RECENT SYSTOLIC BLOOD PRESS GE 130-139MM HG: ICD-10-PCS | Mod: CPTII,S$GLB,, | Performed by: PHYSICIAN ASSISTANT

## 2023-02-17 PROCEDURE — 99999 PR PBB SHADOW E&M-EST. PATIENT-LVL III: ICD-10-PCS | Mod: PBBFAC,,, | Performed by: PHYSICIAN ASSISTANT

## 2023-02-17 RX ORDER — PANTOPRAZOLE SODIUM 40 MG/1
40 TABLET, DELAYED RELEASE ORAL DAILY
Qty: 30 TABLET | Refills: 0 | Status: SHIPPED | OUTPATIENT
Start: 2023-02-17 | End: 2023-03-29 | Stop reason: SDUPTHER

## 2023-02-17 NOTE — PROGRESS NOTES
Subjective:       Patient ID: Torsten Delacruz Jr. is a 27 y.o. male.    Chief Complaint: Abdominal Pain    Abdominal Pain  This is a new problem. The current episode started in the past 7 days. The onset quality is sudden. The problem occurs constantly. The problem has been unchanged. The pain is located in the LUQ and LLQ. The pain is at a severity of 1/10. The quality of the pain is aching and colicky. The abdominal pain does not radiate. Associated symptoms include anorexia, constipation and nausea. Pertinent negatives include no belching, diarrhea, dysuria, fever, flatus, frequency, hematochezia, melena or vomiting. The pain is aggravated by NSAIDs (and oral abx). He has tried antacids for the symptoms. The treatment provided mild relief.   Review of Systems   Constitutional:  Positive for appetite change. Negative for fever.   Gastrointestinal:  Positive for abdominal pain, anorexia, constipation and nausea. Negative for abdominal distention, anal bleeding, blood in stool, diarrhea, flatus, hematochezia, melena and vomiting.   Genitourinary:  Negative for dysuria and frequency.     Objective:      Physical Exam  Constitutional:       General: He is not in acute distress.     Appearance: Normal appearance. He is not ill-appearing.   HENT:      Head: Normocephalic and atraumatic.   Eyes:      Conjunctiva/sclera: Conjunctivae normal.   Pulmonary:      Effort: Pulmonary effort is normal.   Abdominal:      General: Abdomen is flat. Bowel sounds are normal.      Palpations: Abdomen is soft.      Tenderness: There is no abdominal tenderness. There is no guarding or rebound. Negative signs include Peres's sign and McBurney's sign.   Neurological:      Mental Status: He is alert.   Psychiatric:         Mood and Affect: Mood normal.       Assessment:       1. Gastritis, presence of bleeding unspecified, unspecified chronicity, unspecified gastritis type    2. Enteritis          Plan:       Torsten was seen today for  "abdominal pain.    Diagnoses and all orders for this visit:    Gastritis, presence of bleeding unspecified, unspecified chronicity, unspecified gastritis type  -     pantoprazole (PROTONIX) 40 MG tablet; Take 1 tablet (40 mg total) by mouth once daily.    Enteritis     Otc align or culturelle probiotics    Symptoms began after being on doxycycline and high dose nsaids         Follow up for 1-2 weeks .           Documentation entered by me for this encounter may have been done in part using speech-recognition technology. Although I have made an effort to ensure accuracy, "sound like" errors may exist and should be interpreted in context.  "

## 2023-02-24 ENCOUNTER — OFFICE VISIT (OUTPATIENT)
Dept: FAMILY MEDICINE | Facility: CLINIC | Age: 28
End: 2023-02-24
Payer: COMMERCIAL

## 2023-02-24 VITALS
HEIGHT: 71 IN | TEMPERATURE: 98 F | WEIGHT: 208.31 LBS | OXYGEN SATURATION: 99 % | DIASTOLIC BLOOD PRESSURE: 84 MMHG | SYSTOLIC BLOOD PRESSURE: 128 MMHG | HEART RATE: 65 BPM | BODY MASS INDEX: 29.16 KG/M2

## 2023-02-24 DIAGNOSIS — K29.70 GASTRITIS, PRESENCE OF BLEEDING UNSPECIFIED, UNSPECIFIED CHRONICITY, UNSPECIFIED GASTRITIS TYPE: Primary | ICD-10-CM

## 2023-02-24 DIAGNOSIS — Z78.9 MEDICATION INTOLERANCE: ICD-10-CM

## 2023-02-24 PROCEDURE — 99213 OFFICE O/P EST LOW 20 MIN: CPT | Mod: S$GLB,,, | Performed by: PHYSICIAN ASSISTANT

## 2023-02-24 PROCEDURE — 99999 PR PBB SHADOW E&M-EST. PATIENT-LVL III: ICD-10-PCS | Mod: PBBFAC,,, | Performed by: PHYSICIAN ASSISTANT

## 2023-02-24 PROCEDURE — 1159F MED LIST DOCD IN RCRD: CPT | Mod: CPTII,S$GLB,, | Performed by: PHYSICIAN ASSISTANT

## 2023-02-24 PROCEDURE — 99213 PR OFFICE/OUTPT VISIT, EST, LEVL III, 20-29 MIN: ICD-10-PCS | Mod: S$GLB,,, | Performed by: PHYSICIAN ASSISTANT

## 2023-02-24 PROCEDURE — 3079F DIAST BP 80-89 MM HG: CPT | Mod: CPTII,S$GLB,, | Performed by: PHYSICIAN ASSISTANT

## 2023-02-24 PROCEDURE — 99999 PR PBB SHADOW E&M-EST. PATIENT-LVL III: CPT | Mod: PBBFAC,,, | Performed by: PHYSICIAN ASSISTANT

## 2023-02-24 PROCEDURE — 3008F BODY MASS INDEX DOCD: CPT | Mod: CPTII,S$GLB,, | Performed by: PHYSICIAN ASSISTANT

## 2023-02-24 PROCEDURE — 3079F PR MOST RECENT DIASTOLIC BLOOD PRESSURE 80-89 MM HG: ICD-10-PCS | Mod: CPTII,S$GLB,, | Performed by: PHYSICIAN ASSISTANT

## 2023-02-24 PROCEDURE — 3008F PR BODY MASS INDEX (BMI) DOCUMENTED: ICD-10-PCS | Mod: CPTII,S$GLB,, | Performed by: PHYSICIAN ASSISTANT

## 2023-02-24 PROCEDURE — 3074F PR MOST RECENT SYSTOLIC BLOOD PRESSURE < 130 MM HG: ICD-10-PCS | Mod: CPTII,S$GLB,, | Performed by: PHYSICIAN ASSISTANT

## 2023-02-24 PROCEDURE — 1159F PR MEDICATION LIST DOCUMENTED IN MEDICAL RECORD: ICD-10-PCS | Mod: CPTII,S$GLB,, | Performed by: PHYSICIAN ASSISTANT

## 2023-02-24 PROCEDURE — 3074F SYST BP LT 130 MM HG: CPT | Mod: CPTII,S$GLB,, | Performed by: PHYSICIAN ASSISTANT

## 2023-02-24 NOTE — PROGRESS NOTES
"Subjective:       Patient ID: Torsten Delacruz Jr. is a 27 y.o. male.    Chief Complaint: Follow-up    Patient presents for follow up of gastritis and enteritis following course of NSAIDS and doxycycline.  He took PPI and probiotics for about 4 days and symptoms now resolved. No new complaints today     Review of Systems   Gastrointestinal:  Negative for abdominal distention, abdominal pain, anal bleeding, blood in stool, constipation, diarrhea and nausea.     Objective:      Physical Exam  Constitutional:       General: He is not in acute distress.     Appearance: Normal appearance. He is not ill-appearing.   HENT:      Head: Normocephalic and atraumatic.   Eyes:      Conjunctiva/sclera: Conjunctivae normal.   Cardiovascular:      Rate and Rhythm: Normal rate and regular rhythm.   Pulmonary:      Effort: Pulmonary effort is normal.   Neurological:      Mental Status: He is alert.   Psychiatric:         Mood and Affect: Mood normal.       Assessment:       1. Gastritis, resolved     2. Medication intolerance          Plan:       Torsten was seen today for follow-up.    Diagnoses and all orders for this visit:    Gastritis, resolved     Medication intolerance     Marked med intolerance on chart         Follow up for nov with pcp .           Documentation entered by me for this encounter may have been done in part using speech-recognition technology. Although I have made an effort to ensure accuracy, "sound like" errors may exist and should be interpreted in context.     "

## 2023-03-20 ENCOUNTER — TELEPHONE (OUTPATIENT)
Dept: GASTROENTEROLOGY | Facility: CLINIC | Age: 28
End: 2023-03-20
Payer: COMMERCIAL

## 2023-03-20 NOTE — TELEPHONE ENCOUNTER
Returned call to patient to advise that he has the earliest available appointment. Appointment added to waiting list.

## 2023-03-20 NOTE — TELEPHONE ENCOUNTER
----- Message from Loni Hoang sent at 3/20/2023 12:04 PM CDT -----  Caller is requesting a sooner/ same day appointment. Caller declined first available appointment listed below. Caller will not accept being placed on the waitlist and is requesting a message be sent to doctor.        Name of Caller:  JUAN R RIZO JR. [[id number]]      When is the first available appointment?  3/29/23      Symptoms:  stomach pain, lower abdominal bladder/rectal       Best Call Back Number:  [phone]      Additional Information:

## 2023-03-29 ENCOUNTER — OFFICE VISIT (OUTPATIENT)
Dept: GASTROENTEROLOGY | Facility: CLINIC | Age: 28
End: 2023-03-29
Payer: COMMERCIAL

## 2023-03-29 ENCOUNTER — LAB VISIT (OUTPATIENT)
Dept: LAB | Facility: HOSPITAL | Age: 28
End: 2023-03-29
Payer: COMMERCIAL

## 2023-03-29 VITALS
HEIGHT: 71 IN | HEART RATE: 92 BPM | WEIGHT: 205.69 LBS | BODY MASS INDEX: 28.8 KG/M2 | DIASTOLIC BLOOD PRESSURE: 82 MMHG | SYSTOLIC BLOOD PRESSURE: 141 MMHG

## 2023-03-29 DIAGNOSIS — K62.89 RECTAL PAIN: ICD-10-CM

## 2023-03-29 DIAGNOSIS — K59.00 CONSTIPATION, UNSPECIFIED CONSTIPATION TYPE: ICD-10-CM

## 2023-03-29 DIAGNOSIS — R14.3 FLATUS: ICD-10-CM

## 2023-03-29 DIAGNOSIS — R10.30 LOWER ABDOMINAL PAIN: ICD-10-CM

## 2023-03-29 DIAGNOSIS — R19.4 CHANGE IN BOWEL HABITS: ICD-10-CM

## 2023-03-29 DIAGNOSIS — R63.0 DECREASED APPETITE: ICD-10-CM

## 2023-03-29 DIAGNOSIS — R53.83 FATIGUE, UNSPECIFIED TYPE: ICD-10-CM

## 2023-03-29 DIAGNOSIS — R63.4 WEIGHT LOSS: ICD-10-CM

## 2023-03-29 DIAGNOSIS — R10.30 LOWER ABDOMINAL PAIN: Primary | ICD-10-CM

## 2023-03-29 DIAGNOSIS — R10.13 EPIGASTRIC BURNING SENSATION: ICD-10-CM

## 2023-03-29 LAB
ALBUMIN SERPL BCP-MCNC: 4.5 G/DL (ref 3.5–5.2)
ALP SERPL-CCNC: 48 U/L (ref 55–135)
ALT SERPL W/O P-5'-P-CCNC: 19 U/L (ref 10–44)
ANION GAP SERPL CALC-SCNC: 10 MMOL/L (ref 8–16)
AST SERPL-CCNC: 18 U/L (ref 10–40)
BILIRUB SERPL-MCNC: 1.5 MG/DL (ref 0.1–1)
BUN SERPL-MCNC: 15 MG/DL (ref 6–20)
CALCIUM SERPL-MCNC: 9.5 MG/DL (ref 8.7–10.5)
CHLORIDE SERPL-SCNC: 105 MMOL/L (ref 95–110)
CO2 SERPL-SCNC: 25 MMOL/L (ref 23–29)
CREAT SERPL-MCNC: 1.2 MG/DL (ref 0.5–1.4)
ERYTHROCYTE [DISTWIDTH] IN BLOOD BY AUTOMATED COUNT: 11.9 % (ref 11.5–14.5)
EST. GFR  (NO RACE VARIABLE): >60 ML/MIN/1.73 M^2
GLUCOSE SERPL-MCNC: 88 MG/DL (ref 70–110)
HCT VFR BLD AUTO: 48.8 % (ref 40–54)
HGB BLD-MCNC: 16.3 G/DL (ref 14–18)
MCH RBC QN AUTO: 28.9 PG (ref 27–31)
MCHC RBC AUTO-ENTMCNC: 33.4 G/DL (ref 32–36)
MCV RBC AUTO: 87 FL (ref 82–98)
PLATELET # BLD AUTO: 221 K/UL (ref 150–450)
PMV BLD AUTO: 9.4 FL (ref 9.2–12.9)
POTASSIUM SERPL-SCNC: 3.8 MMOL/L (ref 3.5–5.1)
PROT SERPL-MCNC: 7.6 G/DL (ref 6–8.4)
RBC # BLD AUTO: 5.64 M/UL (ref 4.6–6.2)
SODIUM SERPL-SCNC: 140 MMOL/L (ref 136–145)
WBC # BLD AUTO: 4.96 K/UL (ref 3.9–12.7)

## 2023-03-29 PROCEDURE — 3079F PR MOST RECENT DIASTOLIC BLOOD PRESSURE 80-89 MM HG: ICD-10-PCS | Mod: CPTII,S$GLB,,

## 2023-03-29 PROCEDURE — 1160F RVW MEDS BY RX/DR IN RCRD: CPT | Mod: CPTII,S$GLB,,

## 2023-03-29 PROCEDURE — 1160F PR REVIEW ALL MEDS BY PRESCRIBER/CLIN PHARMACIST DOCUMENTED: ICD-10-PCS | Mod: CPTII,S$GLB,,

## 2023-03-29 PROCEDURE — 3008F BODY MASS INDEX DOCD: CPT | Mod: CPTII,S$GLB,,

## 2023-03-29 PROCEDURE — 99204 PR OFFICE/OUTPT VISIT, NEW, LEVL IV, 45-59 MIN: ICD-10-PCS | Mod: S$GLB,,,

## 2023-03-29 PROCEDURE — 99999 PR PBB SHADOW E&M-EST. PATIENT-LVL V: CPT | Mod: PBBFAC,,,

## 2023-03-29 PROCEDURE — 3008F PR BODY MASS INDEX (BMI) DOCUMENTED: ICD-10-PCS | Mod: CPTII,S$GLB,,

## 2023-03-29 PROCEDURE — 85027 COMPLETE CBC AUTOMATED: CPT

## 2023-03-29 PROCEDURE — 99204 OFFICE O/P NEW MOD 45 MIN: CPT | Mod: S$GLB,,,

## 2023-03-29 PROCEDURE — 99999 PR PBB SHADOW E&M-EST. PATIENT-LVL V: ICD-10-PCS | Mod: PBBFAC,,,

## 2023-03-29 PROCEDURE — 1159F MED LIST DOCD IN RCRD: CPT | Mod: CPTII,S$GLB,,

## 2023-03-29 PROCEDURE — 1159F PR MEDICATION LIST DOCUMENTED IN MEDICAL RECORD: ICD-10-PCS | Mod: CPTII,S$GLB,,

## 2023-03-29 PROCEDURE — 36415 COLL VENOUS BLD VENIPUNCTURE: CPT

## 2023-03-29 PROCEDURE — 3079F DIAST BP 80-89 MM HG: CPT | Mod: CPTII,S$GLB,,

## 2023-03-29 PROCEDURE — 3077F PR MOST RECENT SYSTOLIC BLOOD PRESSURE >= 140 MM HG: ICD-10-PCS | Mod: CPTII,S$GLB,,

## 2023-03-29 PROCEDURE — 3077F SYST BP >= 140 MM HG: CPT | Mod: CPTII,S$GLB,,

## 2023-03-29 PROCEDURE — 80053 COMPREHEN METABOLIC PANEL: CPT

## 2023-03-29 RX ORDER — SODIUM PICOSULFATE, MAGNESIUM OXIDE, AND ANHYDROUS CITRIC ACID 10; 3.5; 12 MG/160ML; G/160ML; G/160ML
LIQUID ORAL
Status: CANCELLED | OUTPATIENT
Start: 2023-03-29

## 2023-03-29 RX ORDER — SODIUM PICOSULFATE, MAGNESIUM OXIDE, AND ANHYDROUS CITRIC ACID 10; 3.5; 12 MG/160ML; G/160ML; G/160ML
10 LIQUID ORAL SEE ADMIN INSTRUCTIONS
Qty: 320 ML | Refills: 0 | Status: SHIPPED | OUTPATIENT
Start: 2023-03-29 | End: 2024-01-05

## 2023-03-29 RX ORDER — PANTOPRAZOLE SODIUM 40 MG/1
40 TABLET, DELAYED RELEASE ORAL DAILY
Qty: 30 TABLET | Refills: 2 | Status: SHIPPED | OUTPATIENT
Start: 2023-03-29 | End: 2024-01-05

## 2023-03-29 NOTE — PROGRESS NOTES
Subjective:       Patient ID: Torsten Delacruz Jr. is a 27 y.o. male Body mass index is 28.69 kg/m².    Chief Complaint: Abdominal Pain    This patient is new to me.     Patient's mother present and assisting with history.    Abdominal Pain  This is a new (started at the end of January - February 2023) problem. The current episode started more than 1 month ago (Patient's mother reports symptoms started around the time he started a job with unusual hours and enrolling in school; she reports his sleep pattern & eating patterns have varied due to school and job). The onset quality is sudden. The problem occurs every several days. Duration: Lasts about 7-10 seconds. The problem has been gradually improving. The pain is located in the LLQ, RLQ and epigastric region (Intermittent lower abdominal pain; also reports an episode of epigastric burning that lasted 2 days and occurred last week). The pain is at a severity of 0/10. The patient is experiencing no pain. The quality of the pain is sharp and burning. The abdominal pain radiates to the scrotum. Associated symptoms include constipation (Notice a change in bowel habits 2-3 weeks ago; feels more constipated; currently having 3-4 BMs weekly rated stool 1-5 on [place] scale with occasional straining), flatus and weight loss (Was intentional and he was going to the gym, but he reports GI issues have contributed to weight loss; documented weight loss of 7 lb since 01/25/2023). Pertinent negatives include no anorexia, arthralgias, belching, diarrhea, dysuria, fever, frequency, headaches, hematochezia, hematuria, melena, myalgias, nausea or vomiting. Nothing (He does report taking ibuprofen recently) aggravates the pain. The pain is relieved by Nothing. He has tried proton pump inhibitors (Currently taking Protonix 40 mg once a day; past treatments: Align (not currently taking)) for the symptoms. The treatment provided significant relief. There is no history of abdominal  surgery, colon cancer, Crohn's disease, gallstones, GERD, irritable bowel syndrome, pancreatitis, PUD or ulcerative colitis. Patient's medical history does not include kidney stones and UTI.     Review of Systems   Constitutional:  Positive for activity change, appetite change, fatigue, unexpected weight change and weight loss (Was intentional and he was going to the gym, but he reports GI issues have contributed to weight loss; documented weight loss of 7 lb since 01/25/2023). Negative for chills, diaphoresis and fever.   HENT:  Negative for sore throat and trouble swallowing.    Respiratory:  Negative for cough, choking and shortness of breath.    Cardiovascular:  Negative for chest pain.   Gastrointestinal:  Positive for abdominal pain, constipation (Notice a change in bowel habits 2-3 weeks ago; feels more constipated; currently having 3-4 BMs weekly rated stool 1-5 on [place] scale with occasional straining), flatus and rectal pain (intermittent episodes of rectal pain during BMs and when passing gas). Negative for abdominal distention, anal bleeding, anorexia, blood in stool, diarrhea, hematochezia, melena, nausea and vomiting.   Genitourinary:  Negative for dysuria, frequency and hematuria.   Musculoskeletal:  Negative for arthralgias and myalgias.   Neurological:  Negative for headaches.       No LMP for male patient.  Past Medical History:   Diagnosis Date    Bronchitis     once as a child    Hyperlipidemia      Past Surgical History:   Procedure Laterality Date    CIRCUMCISION       Family History   Problem Relation Age of Onset    No Known Problems Mother     Diabetes Father     Colon cancer Neg Hx     Colon polyps Neg Hx     Esophageal cancer Neg Hx     Stomach cancer Neg Hx     Rectal cancer Neg Hx      Social History     Tobacco Use    Smoking status: Never    Smokeless tobacco: Never   Substance Use Topics    Alcohol use: Yes     Comment: occ    Drug use: No     Wt Readings from Last 10 Encounters:    03/29/23 93.3 kg (205 lb 11 oz)   02/24/23 94.5 kg (208 lb 5.4 oz)   02/17/23 93.9 kg (207 lb 0.2 oz)   01/25/23 96.5 kg (212 lb 11.9 oz)   08/12/22 94.7 kg (208 lb 12.4 oz)   07/29/22 95.2 kg (209 lb 14.1 oz)   11/19/21 94 kg (207 lb 3.7 oz)   06/29/21 88.5 kg (195 lb)   05/18/21 88.5 kg (195 lb)   05/13/21 93.3 kg (205 lb 11 oz)     Lab Results   Component Value Date    WBC 4.96 03/29/2023    HGB 16.3 03/29/2023    HCT 48.8 03/29/2023    MCV 87 03/29/2023     03/29/2023     CMP  Sodium   Date Value Ref Range Status   03/29/2023 140 136 - 145 mmol/L Final     Potassium   Date Value Ref Range Status   03/29/2023 3.8 3.5 - 5.1 mmol/L Final     Chloride   Date Value Ref Range Status   03/29/2023 105 95 - 110 mmol/L Final     CO2   Date Value Ref Range Status   03/29/2023 25 23 - 29 mmol/L Final     Glucose   Date Value Ref Range Status   03/29/2023 88 70 - 110 mg/dL Final     BUN   Date Value Ref Range Status   03/29/2023 15 6 - 20 mg/dL Final     Creatinine   Date Value Ref Range Status   03/29/2023 1.2 0.5 - 1.4 mg/dL Final     Calcium   Date Value Ref Range Status   03/29/2023 9.5 8.7 - 10.5 mg/dL Final     Total Protein   Date Value Ref Range Status   03/29/2023 7.6 6.0 - 8.4 g/dL Final     Albumin   Date Value Ref Range Status   03/29/2023 4.5 3.5 - 5.2 g/dL Final     Total Bilirubin   Date Value Ref Range Status   03/29/2023 1.5 (H) 0.1 - 1.0 mg/dL Final     Comment:     For infants and newborns, interpretation of results should be based  on gestational age, weight and in agreement with clinical  observations.    Premature Infant recommended reference ranges:  Up to 24 hours.............<8.0 mg/dL  Up to 48 hours............<12.0 mg/dL  3-5 days..................<15.0 mg/dL  6-29 days.................<15.0 mg/dL       Alkaline Phosphatase   Date Value Ref Range Status   03/29/2023 48 (L) 55 - 135 U/L Final     AST   Date Value Ref Range Status   03/29/2023 18 10 - 40 U/L Final     ALT   Date Value Ref  Range Status   03/29/2023 19 10 - 44 U/L Final     Anion Gap   Date Value Ref Range Status   03/29/2023 10 8 - 16 mmol/L Final     eGFR if    Date Value Ref Range Status   11/19/2021 >60.0 >60 mL/min/1.73 m^2 Final     eGFR if non    Date Value Ref Range Status   11/19/2021 >60.0 >60 mL/min/1.73 m^2 Final     Comment:     Calculation used to obtain the estimated glomerular filtration  rate (eGFR) is the CKD-EPI equation.        Lab Results   Component Value Date    TSH 1.583 01/25/2023     Reviewed prior medical records including radiology report of scrotal ultrasound 02/01/2023 & endoscopy history (see surgical history).    Objective:      Physical Exam  Vitals and nursing note reviewed.   Constitutional:       General: He is not in acute distress.     Appearance: Normal appearance. He is not ill-appearing.   HENT:      Mouth/Throat:      Lips: Pink. No lesions.   Cardiovascular:      Rate and Rhythm: Normal rate and regular rhythm.      Pulses: Normal pulses.   Pulmonary:      Effort: Pulmonary effort is normal. No respiratory distress.   Abdominal:      General: Abdomen is flat. Bowel sounds are normal. There is no distension or abdominal bruit. There are no signs of injury.      Palpations: Abdomen is soft. There is no shifting dullness, fluid wave, hepatomegaly, splenomegaly or mass.      Tenderness: There is abdominal tenderness (mild) in the right lower quadrant. There is no guarding or rebound. Negative signs include Peres's sign, Rovsing's sign and McBurney's sign.   Skin:     General: Skin is warm and dry.      Coloration: Skin is not jaundiced or pale.   Neurological:      Mental Status: He is alert and oriented to person, place, and time.   Psychiatric:         Attention and Perception: Attention normal.         Mood and Affect: Mood normal.         Speech: Speech normal.         Behavior: Behavior normal.       Assessment:       1. Lower abdominal pain    2. Epigastric  burning sensation    3. Constipation, unspecified constipation type    4. Change in bowel habits    5. Rectal pain    6. Flatus    7. Weight loss    8. Decreased appetite    9. Fatigue, unspecified type        Plan:       Lower abdominal pain  - schedule Colonoscopy, discussed procedure with the patient, including risks and benefits, patient verbalized understanding  -     CT Abdomen Pelvis With Contrast; Future; Expected date: 03/29/2023  -     CBC Without Differential; Future; Expected date: 03/29/2023  -     Comprehensive Metabolic Panel; Future; Expected date: 03/29/2023  -     Case Request Endoscopy: COLONOSCOPY  -restart Align (reports Align improved symptoms)    Epigastric burning sensation  - schedule EGD, discussed procedure with patient, including risks and benefits, patient verbalized understanding  -avoid large meals, avoid eating within 2-3 hours of bedtime (avoid late night eating & lying down soon after eating), elevate head of bed if nocturnal symptoms are present, smoking cessation (if current smoker), & weight loss (if overweight).   -avoid known foods which trigger reflux symptoms & to minimize/avoid high-fat foods, chocolate, caffeine, citrus, alcohol, & tomato products.  -avoid/limit use of NSAID's, since they can cause GI upset, bleeding, and/or ulcers. If needed, take with food.   -     CBC Without Differential; Future; Expected date: 03/29/2023  -     Comprehensive Metabolic Panel; Future; Expected date: 03/29/2023  -REFILL: pantoprazole (PROTONIX) 40 MG tablet; Take 1 tablet (40 mg total) by mouth once daily.  Dispense: 30 tablet; Refill: 2  -     Case Request Endoscopy: EGD (ESOPHAGOGASTRODUODENOSCOPY)    Constipation, unspecified constipation type  - schedule Colonoscopy, discussed procedure with the patient, including risks and benefits, patient verbalized understanding  -Recommend daily exercise as tolerated, adequate water intake (six 8-oz glasses of water daily), and high fiber diet.  OTC fiber supplements are recommended if diet does not reach daily fiber goal (20-30 grams daily), such as Metamucil, Citrucel, or FiberCon (take as directed, separate from other oral medications by >2 hours).  -Recommend trying OTC MiraLax once daily (17g PO) as directed  -If no improvement with above recommendations, try intermittently dosed Dulcolax OTC as directed (every 3-4 days) PRN to facilitate bowel movements  -If no relief with this, consider adding a emollient laxative (castor oil or mineral oil) +/- enema  -If still no improvement with these measures, call/follow-up  -     Case Request Endoscopy: COLONOSCOPY  -     sod picosulf-mag ox-citric ac (CLENPIQ) 10 mg-3.5 gram- 12 gram/160 mL Soln; Take 10 mg by mouth As instructed (handout provided in clinic).  Dispense: 320 mL; Refill: 0    Change in bowel habits  - schedule Colonoscopy, discussed procedure with the patient, including risks and benefits, patient verbalized understanding  -     Case Request Endoscopy: COLONOSCOPY    Rectal pain  - schedule Colonoscopy, discussed procedure with the patient, including risks and benefits, patient verbalized understanding  - avoid constipation and straining with bowel movements; try using fiber supplement such as metamucil (take as directed)  - possible referral to colorectal surgery if symptoms persist    Flatus  - schedule Colonoscopy, discussed procedure with the patient, including risks and benefits, patient verbalized understanding  - I recommend OTC simethicone as directed, such as Phazyme or Gas-x  - I recommend low gas diet: Reduce or eliminate these foods from your diet: Broccoli, Cauliflower, Hill sprouts, Cabbage, Cooked dried beans, Carbonated beverages (sparkling water, soda, beer, champagne)  Other Causes Of Excess Gas Include:   CONSTIPATION can increase the amount of normal intestinal gas.  --> Avoid constipation by increasing the amount of fiber in your diet by including whole cereal grains,  fresh vegetables (except those in the above list) and fresh fruits. High-fiber foods absorb water and carry it out of the body. When increasing the amount of fiber in your diet, you also need to increase the amount of water that you drink. You should drink at least eight 8-ounce glasses of water (two quarts) per day.  -     Case Request Endoscopy: COLONOSCOPY    Weight loss & Decreased appetite  - schedule EGD, discussed procedure with patient, including risks and benefits, patient verbalized understanding  - schedule Colonoscopy, discussed procedure with the patient, including risks and benefits, patient verbalized understanding  - recommend daily calorie counts to ensure adequate nutrition is taken in, (at least 2,000 calories a day)  - I recommend nutritional drinks, such as Boost, Ensure or Glucerna, to supplement nutrition needs  -     CBC Without Differential; Future; Expected date: 03/29/2023  -     Comprehensive Metabolic Panel; Future; Expected date: 03/29/2023  -     CT Abdomen Pelvis With Contrast; Future; Expected date: 03/29/2023  -     Case Request Endoscopy: COLONOSCOPY  -     Case Request Endoscopy: EGD (ESOPHAGOGASTRODUODENOSCOPY)    Fatigue, unspecified type  -follow-up with PCP for continued evaluation and management  -try to get at least 7-9 hours of sleep   -     CBC Without Differential; Future; Expected date: 03/29/2023  -     Comprehensive Metabolic Panel; Future; Expected date: 03/29/2023    Follow up in about 4 weeks (around 4/26/2023), or if symptoms worsen or fail to improve.      If no improvement in symptoms or symptoms worsen, call/follow-up at clinic or go to ER.        45 minutes of total time spent on the encounter, which includes face to face time and non-face to face time preparing to see the patient (eg, review of tests), Obtaining and/or reviewing separately obtained history, Documenting clinical information in the electronic or other health record, Independently interpreting  results (not separately reported) and communicating results to the patient/family/caregiver, or Care coordination (not separately reported).     A dictation software program was used for this note. Please expect some simple typographical  errors in this note.

## 2023-04-01 ENCOUNTER — HOSPITAL ENCOUNTER (OUTPATIENT)
Dept: RADIOLOGY | Facility: HOSPITAL | Age: 28
Discharge: HOME | End: 2023-04-01
Payer: COMMERCIAL

## 2023-04-01 DIAGNOSIS — R10.30 LOWER ABDOMINAL PAIN: ICD-10-CM

## 2023-04-01 DIAGNOSIS — R63.4 WEIGHT LOSS: ICD-10-CM

## 2023-04-01 DIAGNOSIS — R10.13 EPIGASTRIC BURNING SENSATION: ICD-10-CM

## 2023-04-01 PROCEDURE — 74177 CT ABDOMEN PELVIS WITH CONTRAST: ICD-10-PCS | Mod: 26,,, | Performed by: RADIOLOGY

## 2023-04-01 PROCEDURE — 25500020 PHARM REV CODE 255

## 2023-04-01 PROCEDURE — 74177 CT ABD & PELVIS W/CONTRAST: CPT | Mod: 26,,, | Performed by: RADIOLOGY

## 2023-04-01 PROCEDURE — 74177 CT ABD & PELVIS W/CONTRAST: CPT | Mod: TC

## 2023-04-01 PROCEDURE — A9698 NON-RAD CONTRAST MATERIALNOC: HCPCS

## 2023-04-01 RX ADMIN — BARIUM SULFATE 900 ML: 20 SUSPENSION ORAL at 02:04

## 2023-04-01 RX ADMIN — IOHEXOL 100 ML: 350 INJECTION, SOLUTION INTRAVENOUS at 02:04

## 2023-04-17 ENCOUNTER — TELEPHONE (OUTPATIENT)
Dept: GASTROENTEROLOGY | Facility: CLINIC | Age: 28
End: 2023-04-17
Payer: COMMERCIAL

## 2023-04-17 NOTE — TELEPHONE ENCOUNTER
----- Message from Renan Samson, Patient Care Assistant sent at 4/17/2023 11:19 AM CDT -----  Contact: Pt  Type: Needs Medical Advice    Who Called: Pt  Best Call Back Number: [phone]  Inquiry/Question: Pt is calling to cancel both of his procedures. Thank you~

## 2023-04-17 NOTE — TELEPHONE ENCOUNTER
Call placed to Donavan Jayme in regards to message received. He wished to cancel both his EGD and colonoscopy for 4/20 and 4/27. He did not wish to reschedule. No further issues noted.

## 2023-08-23 ENCOUNTER — PATIENT MESSAGE (OUTPATIENT)
Dept: FAMILY MEDICINE | Facility: CLINIC | Age: 28
End: 2023-08-23
Payer: COMMERCIAL

## 2023-10-11 ENCOUNTER — PATIENT MESSAGE (OUTPATIENT)
Dept: FAMILY MEDICINE | Facility: CLINIC | Age: 28
End: 2023-10-11

## 2024-01-05 ENCOUNTER — OFFICE VISIT (OUTPATIENT)
Dept: FAMILY MEDICINE | Facility: CLINIC | Age: 29
End: 2024-01-05
Payer: COMMERCIAL

## 2024-01-05 ENCOUNTER — LAB VISIT (OUTPATIENT)
Dept: LAB | Facility: HOSPITAL | Age: 29
End: 2024-01-05
Attending: STUDENT IN AN ORGANIZED HEALTH CARE EDUCATION/TRAINING PROGRAM
Payer: COMMERCIAL

## 2024-01-05 VITALS
SYSTOLIC BLOOD PRESSURE: 118 MMHG | HEART RATE: 83 BPM | WEIGHT: 198.44 LBS | DIASTOLIC BLOOD PRESSURE: 72 MMHG | HEIGHT: 71 IN | OXYGEN SATURATION: 99 % | RESPIRATION RATE: 18 BRPM | BODY MASS INDEX: 27.78 KG/M2

## 2024-01-05 DIAGNOSIS — J10.1 INFLUENZA B: ICD-10-CM

## 2024-01-05 DIAGNOSIS — Z00.00 ROUTINE GENERAL MEDICAL EXAMINATION AT A HEALTH CARE FACILITY: Primary | ICD-10-CM

## 2024-01-05 DIAGNOSIS — Z00.00 ROUTINE GENERAL MEDICAL EXAMINATION AT A HEALTH CARE FACILITY: ICD-10-CM

## 2024-01-05 PROCEDURE — 99999 PR PBB SHADOW E&M-EST. PATIENT-LVL III: CPT | Mod: PBBFAC,,, | Performed by: STUDENT IN AN ORGANIZED HEALTH CARE EDUCATION/TRAINING PROGRAM

## 2024-01-05 PROCEDURE — 87491 CHLMYD TRACH DNA AMP PROBE: CPT | Performed by: STUDENT IN AN ORGANIZED HEALTH CARE EDUCATION/TRAINING PROGRAM

## 2024-01-05 PROCEDURE — 1160F RVW MEDS BY RX/DR IN RCRD: CPT | Mod: CPTII,S$GLB,, | Performed by: STUDENT IN AN ORGANIZED HEALTH CARE EDUCATION/TRAINING PROGRAM

## 2024-01-05 PROCEDURE — 3074F SYST BP LT 130 MM HG: CPT | Mod: CPTII,S$GLB,, | Performed by: STUDENT IN AN ORGANIZED HEALTH CARE EDUCATION/TRAINING PROGRAM

## 2024-01-05 PROCEDURE — 1159F MED LIST DOCD IN RCRD: CPT | Mod: CPTII,S$GLB,, | Performed by: STUDENT IN AN ORGANIZED HEALTH CARE EDUCATION/TRAINING PROGRAM

## 2024-01-05 PROCEDURE — 3008F BODY MASS INDEX DOCD: CPT | Mod: CPTII,S$GLB,, | Performed by: STUDENT IN AN ORGANIZED HEALTH CARE EDUCATION/TRAINING PROGRAM

## 2024-01-05 PROCEDURE — 99395 PREV VISIT EST AGE 18-39: CPT | Mod: S$GLB,,, | Performed by: STUDENT IN AN ORGANIZED HEALTH CARE EDUCATION/TRAINING PROGRAM

## 2024-01-05 PROCEDURE — 3078F DIAST BP <80 MM HG: CPT | Mod: CPTII,S$GLB,, | Performed by: STUDENT IN AN ORGANIZED HEALTH CARE EDUCATION/TRAINING PROGRAM

## 2024-01-05 RX ORDER — OSELTAMIVIR PHOSPHATE 75 MG/1
75 CAPSULE ORAL EVERY 12 HOURS
COMMUNITY
Start: 2024-01-02 | End: 2024-02-22 | Stop reason: ALTCHOICE

## 2024-01-05 RX ORDER — BENZONATATE 200 MG/1
200 CAPSULE ORAL
COMMUNITY
Start: 2024-01-02 | End: 2024-02-22 | Stop reason: ALTCHOICE

## 2024-01-05 NOTE — PATIENT INSTRUCTIONS
Beau Sarmiento,     If you are due for any health screening(s) below please notify me so we can arrange them to be ordered and scheduled. Most healthy patients at your age complete them, but you are free to accept or refuse.     If you can't do it, I'll definitely understand. If you can, I'd certainly appreciate it!    All of your core healthy metrics are met.

## 2024-01-05 NOTE — PROGRESS NOTES
"[facility]   OFFICE VISIT NOTE    Patient Name: Torsten Delacruz Jr.  YOB: 1995    PRESENTING HISTORY     History of Present Illness:  Mr. Torsten Delacruz Jr. is a 28 y.o. male here to establish care   Patient was diagnosed with flu B 3 days ago and now taking Tamiflu and Tessalon perles  Having some chills and takes Tylenol as needed    He works as a . Admits to drinking occasionally, socially  Denies any smoking or using illicit drugs  Sexually active with one female partner without use of protection       Review of Systems   Constitutional:  Positive for appetite change and chills. Negative for diaphoresis, fatigue and fever.   Respiratory:  Negative for shortness of breath and wheezing.    Cardiovascular:  Negative for chest pain and palpitations.   Gastrointestinal:  Negative for nausea and vomiting.   Genitourinary:  Negative for bladder incontinence.   Neurological:  Negative for coordination difficulties.   Psychiatric/Behavioral:  Negative for behavioral problems.           OBJECTIVE:   Vital Signs:  Vitals:    01/05/24 0846   BP: 118/72   Pulse: 83   Resp: 18   SpO2: 99%   Weight: 90 kg (198 lb 6.6 oz)   Height: 5' 11" (1.803 m)           Physical Exam  Constitutional:       General: He is not in acute distress.     Appearance: He is not ill-appearing or toxic-appearing.   HENT:      Head: Normocephalic and atraumatic.      Right Ear: Tympanic membrane, ear canal and external ear normal.      Left Ear: Tympanic membrane, ear canal and external ear normal.      Mouth/Throat:      Mouth: Mucous membranes are moist.      Pharynx: Uvula midline. No pharyngeal swelling.   Cardiovascular:      Rate and Rhythm: Normal rate and regular rhythm.   Pulmonary:      Effort: Pulmonary effort is normal. No tachypnea, bradypnea, accessory muscle usage, prolonged expiration or respiratory distress.      Breath sounds: Normal breath sounds. No stridor. No wheezing, " rhonchi or rales.   Neurological:      General: No focal deficit present.      Mental Status: He is alert.   Psychiatric:         Mood and Affect: Mood normal.         Behavior: Behavior normal.         ASSESSMENT & PLAN:     Routine general medical examination at a health care facility  -     CBC Without Differential; Future; Expected date: 01/05/2024  -     Comprehensive Metabolic Panel; Future; Expected date: 01/05/2024  -     Lipid Panel; Future; Expected date: 01/05/2024  -     Hemoglobin A1C; Future; Expected date: 01/05/2024  -     HIV 1/2 Ag/Ab (4th Gen); Future; Expected date: 01/05/2024  -     RPR; Future; Expected date: 01/05/2024  -     Hepatitis Panel, Acute; Future; Expected date: 01/05/2024  -     C. trachomatis/N. gonorrhoeae by AMP DNA [facility]; Urine; Future; Expected date: 01/05/2024    BMI 27.0-27.9,adult    Influenza B  Continue with Tamiflu and cough medication   Encouraged plenty of rest and hydration      Return to clinic in 1 year for another annual exam     Eliz Cordoba MD  Family Medicine  [facility] [place]     This note was created using [employer] voice recognition software that occasionally misinterprets phrases or words

## 2024-01-10 LAB
C TRACH DNA SPEC QL NAA+PROBE: NOT DETECTED
N GONORRHOEA DNA SPEC QL NAA+PROBE: NOT DETECTED

## 2024-01-24 NOTE — PROGRESS NOTES
"[facility] [place]   OFFICE VISIT NOTE    Patient Name: Torsten Delacruz Jr.  YOB: 1995    PRESENTING HISTORY     History of Present Illness:  Mr. Torsten Delacruz Jr. is a 28 y.o. male coming in for fever, groin pain.   I recently saw this patient for establishing care visit as well as flu.    States that he has been having fever over 100.4° F since 4 days ago.  He also endorses having right groin pain for few days.  Aggravating factor including pressing on his groin, bending over, coughing.  Denies any diarrhea, nausea, vomiting, constipation.  Last BM was yesterday.    He also denies any recent traveling, trying new food.  Denies any blood in his stool or urine.  Denies any dysuria, hematuria, increasing urinary frequency or urgency.  He denies having any surgeries in his abdomen before.  He still has a appendix.  Denies any new abdominal trauma.    Denies having any new sexual partner.  Denies any abnormal discharge from his penis or having penile pain.    Review of Systems   Constitutional:  Negative for fever.   Respiratory:  Positive for cough. Negative for shortness of breath.    Cardiovascular:  Negative for chest pain.   Gastrointestinal:  Positive for abdominal pain. Negative for blood in stool, change in bowel habit, constipation, diarrhea, nausea, vomiting and reflux.   Genitourinary:  Negative for bladder incontinence, decreased urine volume, difficulty urinating, discharge, dysuria, hematuria, penile pain, penile swelling, scrotal swelling, testicular pain and urgency.        + groin pain           OBJECTIVE:   Vital Signs:  Vitals:    01/25/24 1110   BP: 108/62   Pulse: 83   Resp: 16   Temp: 97.7 °F (36.5 °C)   TempSrc: Oral   SpO2: 99%   Weight: 88.3 kg (194 lb 10.7 oz)   Height: 5' 11" (1.803 m)           Physical Exam  Exam conducted with a chaperone present.   Constitutional:       General: He is not in acute distress.     Appearance: He is not ill-appearing or " toxic-appearing.   HENT:      Head: Normocephalic and atraumatic.      Mouth/Throat:      Pharynx: Uvula midline. No pharyngeal swelling.   Cardiovascular:      Rate and Rhythm: Normal rate and regular rhythm.   Pulmonary:      Effort: Pulmonary effort is normal. No tachypnea, bradypnea, accessory muscle usage, prolonged expiration or respiratory distress.      Breath sounds: Normal breath sounds. No stridor. No wheezing, rhonchi or rales.   Genitourinary:     Penis: Circumcised. No phimosis, tenderness, discharge, swelling or lesions.       Testes: Normal.         Right: Mass, tenderness or swelling not present. Right testis is descended. Cremasteric reflex is present.          Left: Mass, tenderness or swelling not present. Left testis is descended. Cremasteric reflex is present.       Comments: Right groin tender to palpation. No erythema but has right inguinal adenopathy on exam   Lymphadenopathy:      Lower Body: Right inguinal adenopathy present.   Neurological:      General: No focal deficit present.      Mental Status: He is alert.   Psychiatric:         Mood and Affect: Mood normal.         Behavior: Behavior normal.         ASSESSMENT & PLAN:     Right groin pain  -     Urinalysis; Future; Expected date: 01/25/2024  -     CBC Auto Differential; Future; Expected date: 01/25/2024  -     Sedimentation rate; Future; Expected date: 01/25/2024  -     C-REACTIVE PROTEIN; Future; Expected date: 01/25/2024  -     US Soft Tissue, Groin Right; Future; Expected date: 01/25/2024  -     LACTIC ACID, PLASMA; Future; Expected date: 01/25/2024  -     BASIC METABOLIC PANEL; Future; Expected date: 01/25/2024  Differentials: inguinal hernia, benign reactive lymphadenopathy, malignancy, STD, UTI  Right groin tender to palpation and + right inguinal adenopathy   If groin US shows hernia, then will refer to gen surg for further evaluation and treatment    BMI 27.0-27.9,adult    Lymphadenopathy, inguinal  Differentials: kaposi  sarcoma, malignancy, reactive lymphadenopathy from recent illness (flu), hernia, STD   Will obtain US groin to further investigate  Also getting lab works including UA and CBC diff          Eliz Cordoba MD  Family Medicine  [facility] [place]     This note was created using [email or web address] voice recognition software that occasionally misinterprets phrases or words

## 2024-01-25 ENCOUNTER — LAB VISIT (OUTPATIENT)
Dept: LAB | Facility: HOSPITAL | Age: 29
End: 2024-01-25
Attending: STUDENT IN AN ORGANIZED HEALTH CARE EDUCATION/TRAINING PROGRAM
Payer: COMMERCIAL

## 2024-01-25 ENCOUNTER — HOSPITAL ENCOUNTER (OUTPATIENT)
Dept: RADIOLOGY | Facility: HOSPITAL | Age: 29
Discharge: HOME | End: 2024-01-25
Attending: STUDENT IN AN ORGANIZED HEALTH CARE EDUCATION/TRAINING PROGRAM
Payer: COMMERCIAL

## 2024-01-25 ENCOUNTER — OFFICE VISIT (OUTPATIENT)
Dept: FAMILY MEDICINE | Facility: CLINIC | Age: 29
End: 2024-01-25
Payer: COMMERCIAL

## 2024-01-25 VITALS
RESPIRATION RATE: 16 BRPM | WEIGHT: 194.69 LBS | TEMPERATURE: 98 F | HEIGHT: 71 IN | OXYGEN SATURATION: 99 % | SYSTOLIC BLOOD PRESSURE: 108 MMHG | HEART RATE: 83 BPM | BODY MASS INDEX: 27.26 KG/M2 | DIASTOLIC BLOOD PRESSURE: 62 MMHG

## 2024-01-25 DIAGNOSIS — R59.0 LYMPHADENOPATHY, INGUINAL: ICD-10-CM

## 2024-01-25 DIAGNOSIS — R10.31 RIGHT GROIN PAIN: ICD-10-CM

## 2024-01-25 DIAGNOSIS — R10.31 RIGHT GROIN PAIN: Primary | ICD-10-CM

## 2024-01-25 LAB
ANION GAP SERPL CALC-SCNC: 9 MMOL/L (ref 8–16)
BASOPHILS # BLD AUTO: 0.05 K/UL (ref 0–0.2)
BASOPHILS NFR BLD: 0.6 % (ref 0–1.9)
BUN SERPL-MCNC: 11 MG/DL (ref 6–20)
CALCIUM SERPL-MCNC: 10 MG/DL (ref 8.7–10.5)
CHLORIDE SERPL-SCNC: 104 MMOL/L (ref 95–110)
CO2 SERPL-SCNC: 24 MMOL/L (ref 23–29)
CREAT SERPL-MCNC: 1.2 MG/DL (ref 0.5–1.4)
CRP SERPL-MCNC: 45.4 MG/L (ref 0–8.2)
DIFFERENTIAL METHOD BLD: NORMAL
EOSINOPHIL # BLD AUTO: 0.2 K/UL (ref 0–0.5)
EOSINOPHIL NFR BLD: 2.7 % (ref 0–8)
ERYTHROCYTE [DISTWIDTH] IN BLOOD BY AUTOMATED COUNT: 12.3 % (ref 11.5–14.5)
ERYTHROCYTE [SEDIMENTATION RATE] IN BLOOD BY WESTERGREN METHOD: 7 MM/HR (ref 0–10)
EST. GFR  (NO RACE VARIABLE): >60 ML/MIN/1.73 M^2
GLUCOSE SERPL-MCNC: 62 MG/DL (ref 70–110)
HCT VFR BLD AUTO: 47.1 % (ref 40–54)
HGB BLD-MCNC: 15.9 G/DL (ref 14–18)
IMM GRANULOCYTES # BLD AUTO: 0.01 K/UL (ref 0–0.04)
IMM GRANULOCYTES NFR BLD AUTO: 0.1 % (ref 0–0.5)
LYMPHOCYTES # BLD AUTO: 2.3 K/UL (ref 1–4.8)
LYMPHOCYTES NFR BLD: 28.4 % (ref 18–48)
MCH RBC QN AUTO: 28.6 PG (ref 27–31)
MCHC RBC AUTO-ENTMCNC: 33.8 G/DL (ref 32–36)
MCV RBC AUTO: 85 FL (ref 82–98)
MONOCYTES # BLD AUTO: 0.9 K/UL (ref 0.3–1)
MONOCYTES NFR BLD: 10.8 % (ref 4–15)
NEUTROPHILS # BLD AUTO: 4.6 K/UL (ref 1.8–7.7)
NEUTROPHILS NFR BLD: 57.4 % (ref 38–73)
NRBC BLD-RTO: 0 /100 WBC
PLATELET # BLD AUTO: 291 K/UL (ref 150–450)
PMV BLD AUTO: 10.1 FL (ref 9.2–12.9)
POTASSIUM SERPL-SCNC: 4.4 MMOL/L (ref 3.5–5.1)
RBC # BLD AUTO: 5.56 M/UL (ref 4.6–6.2)
SODIUM SERPL-SCNC: 137 MMOL/L (ref 136–145)
WBC # BLD AUTO: 8.05 K/UL (ref 3.9–12.7)

## 2024-01-25 PROCEDURE — 85651 RBC SED RATE NONAUTOMATED: CPT | Mod: PO | Performed by: STUDENT IN AN ORGANIZED HEALTH CARE EDUCATION/TRAINING PROGRAM

## 2024-01-25 PROCEDURE — 83605 ASSAY OF LACTIC ACID: CPT | Performed by: STUDENT IN AN ORGANIZED HEALTH CARE EDUCATION/TRAINING PROGRAM

## 2024-01-25 PROCEDURE — 3078F DIAST BP <80 MM HG: CPT | Mod: CPTII,S$GLB,, | Performed by: STUDENT IN AN ORGANIZED HEALTH CARE EDUCATION/TRAINING PROGRAM

## 2024-01-25 PROCEDURE — 86140 C-REACTIVE PROTEIN: CPT | Performed by: STUDENT IN AN ORGANIZED HEALTH CARE EDUCATION/TRAINING PROGRAM

## 2024-01-25 PROCEDURE — 76882 US LMTD JT/FCL EVL NVASC XTR: CPT | Mod: 26,RT,, | Performed by: RADIOLOGY

## 2024-01-25 PROCEDURE — 76882 US LMTD JT/FCL EVL NVASC XTR: CPT | Mod: TC,RT

## 2024-01-25 PROCEDURE — 36415 COLL VENOUS BLD VENIPUNCTURE: CPT | Mod: PO | Performed by: STUDENT IN AN ORGANIZED HEALTH CARE EDUCATION/TRAINING PROGRAM

## 2024-01-25 PROCEDURE — 1159F MED LIST DOCD IN RCRD: CPT | Mod: CPTII,S$GLB,, | Performed by: STUDENT IN AN ORGANIZED HEALTH CARE EDUCATION/TRAINING PROGRAM

## 2024-01-25 PROCEDURE — 99214 OFFICE O/P EST MOD 30 MIN: CPT | Mod: S$GLB,,, | Performed by: STUDENT IN AN ORGANIZED HEALTH CARE EDUCATION/TRAINING PROGRAM

## 2024-01-25 PROCEDURE — 80048 BASIC METABOLIC PNL TOTAL CA: CPT | Performed by: STUDENT IN AN ORGANIZED HEALTH CARE EDUCATION/TRAINING PROGRAM

## 2024-01-25 PROCEDURE — 1160F RVW MEDS BY RX/DR IN RCRD: CPT | Mod: CPTII,S$GLB,, | Performed by: STUDENT IN AN ORGANIZED HEALTH CARE EDUCATION/TRAINING PROGRAM

## 2024-01-25 PROCEDURE — 3008F BODY MASS INDEX DOCD: CPT | Mod: CPTII,S$GLB,, | Performed by: STUDENT IN AN ORGANIZED HEALTH CARE EDUCATION/TRAINING PROGRAM

## 2024-01-25 PROCEDURE — 3044F HG A1C LEVEL LT 7.0%: CPT | Mod: CPTII,S$GLB,, | Performed by: STUDENT IN AN ORGANIZED HEALTH CARE EDUCATION/TRAINING PROGRAM

## 2024-01-25 PROCEDURE — 99999 PR PBB SHADOW E&M-EST. PATIENT-LVL IV: CPT | Mod: PBBFAC,,, | Performed by: STUDENT IN AN ORGANIZED HEALTH CARE EDUCATION/TRAINING PROGRAM

## 2024-01-25 PROCEDURE — 85025 COMPLETE CBC W/AUTO DIFF WBC: CPT | Performed by: STUDENT IN AN ORGANIZED HEALTH CARE EDUCATION/TRAINING PROGRAM

## 2024-01-25 PROCEDURE — 3074F SYST BP LT 130 MM HG: CPT | Mod: CPTII,S$GLB,, | Performed by: STUDENT IN AN ORGANIZED HEALTH CARE EDUCATION/TRAINING PROGRAM

## 2024-01-26 LAB — LACTATE SERPL-SCNC: 1.5 MMOL/L (ref 0.5–2.2)

## 2024-01-30 ENCOUNTER — TELEPHONE (OUTPATIENT)
Dept: FAMILY MEDICINE | Facility: CLINIC | Age: 29
End: 2024-01-30
Payer: COMMERCIAL

## 2024-01-30 NOTE — TELEPHONE ENCOUNTER
Could we call this patient and schedule a follow up with me in 2 weeks for his groin lymphadenopathy?     Thank you,  Eliz Cordoba MD

## 2024-02-22 ENCOUNTER — LAB VISIT (OUTPATIENT)
Dept: LAB | Facility: HOSPITAL | Age: 29
End: 2024-02-22
Attending: STUDENT IN AN ORGANIZED HEALTH CARE EDUCATION/TRAINING PROGRAM
Payer: COMMERCIAL

## 2024-02-22 ENCOUNTER — OFFICE VISIT (OUTPATIENT)
Dept: FAMILY MEDICINE | Facility: CLINIC | Age: 29
End: 2024-02-22
Payer: COMMERCIAL

## 2024-02-22 VITALS
WEIGHT: 196 LBS | OXYGEN SATURATION: 99 % | BODY MASS INDEX: 27.44 KG/M2 | HEART RATE: 65 BPM | HEIGHT: 71 IN | RESPIRATION RATE: 18 BRPM | SYSTOLIC BLOOD PRESSURE: 114 MMHG | DIASTOLIC BLOOD PRESSURE: 80 MMHG

## 2024-02-22 DIAGNOSIS — Z20.2 EXPOSURE TO CHLAMYDIA: ICD-10-CM

## 2024-02-22 DIAGNOSIS — R59.0 LYMPHADENOPATHY, INGUINAL: ICD-10-CM

## 2024-02-22 DIAGNOSIS — G47.00 INSOMNIA, UNSPECIFIED TYPE: Primary | ICD-10-CM

## 2024-02-22 PROCEDURE — 3074F SYST BP LT 130 MM HG: CPT | Mod: CPTII,S$GLB,, | Performed by: STUDENT IN AN ORGANIZED HEALTH CARE EDUCATION/TRAINING PROGRAM

## 2024-02-22 PROCEDURE — 3044F HG A1C LEVEL LT 7.0%: CPT | Mod: CPTII,S$GLB,, | Performed by: STUDENT IN AN ORGANIZED HEALTH CARE EDUCATION/TRAINING PROGRAM

## 2024-02-22 PROCEDURE — 3008F BODY MASS INDEX DOCD: CPT | Mod: CPTII,S$GLB,, | Performed by: STUDENT IN AN ORGANIZED HEALTH CARE EDUCATION/TRAINING PROGRAM

## 2024-02-22 PROCEDURE — 1159F MED LIST DOCD IN RCRD: CPT | Mod: CPTII,S$GLB,, | Performed by: STUDENT IN AN ORGANIZED HEALTH CARE EDUCATION/TRAINING PROGRAM

## 2024-02-22 PROCEDURE — 87491 CHLMYD TRACH DNA AMP PROBE: CPT | Performed by: STUDENT IN AN ORGANIZED HEALTH CARE EDUCATION/TRAINING PROGRAM

## 2024-02-22 PROCEDURE — 1160F RVW MEDS BY RX/DR IN RCRD: CPT | Mod: CPTII,S$GLB,, | Performed by: STUDENT IN AN ORGANIZED HEALTH CARE EDUCATION/TRAINING PROGRAM

## 2024-02-22 PROCEDURE — 99999 PR PBB SHADOW E&M-EST. PATIENT-LVL III: CPT | Mod: PBBFAC,,, | Performed by: STUDENT IN AN ORGANIZED HEALTH CARE EDUCATION/TRAINING PROGRAM

## 2024-02-22 PROCEDURE — 99214 OFFICE O/P EST MOD 30 MIN: CPT | Mod: S$GLB,,, | Performed by: STUDENT IN AN ORGANIZED HEALTH CARE EDUCATION/TRAINING PROGRAM

## 2024-02-22 PROCEDURE — 3079F DIAST BP 80-89 MM HG: CPT | Mod: CPTII,S$GLB,, | Performed by: STUDENT IN AN ORGANIZED HEALTH CARE EDUCATION/TRAINING PROGRAM

## 2024-02-22 NOTE — PROGRESS NOTES
"[facility] [place]   OFFICE VISIT NOTE    Patient Name: Torsten Delacruz Jr.  YOB: 1995    PRESENTING HISTORY     History of Present Illness:  Mr. Torsten Delacruz Jr. is a 28 y.o. male here for insomnia, STD check, and right groin lymphadenopathy follow up    Review of Systems   Constitutional:  Negative for chills, diaphoresis, fatigue and fever.   Respiratory:  Negative for cough, shortness of breath and wheezing.    Cardiovascular:  Negative for chest pain and palpitations.   Gastrointestinal:  Negative for constipation, diarrhea, nausea and vomiting.   Genitourinary:  Negative for bladder incontinence, decreased urine volume, discharge, frequency, penile pain, penile swelling, scrotal swelling and testicular pain.   Neurological:  Negative for coordination difficulties.   Psychiatric/Behavioral:  Positive for sleep disturbance. Negative for behavioral problems.           OBJECTIVE:   Vital Signs:  Vitals:    02/22/24 1101   BP: 114/80   Pulse: 65   Resp: 18   SpO2: 99%   Weight: 88.9 kg (195 lb 15.8 oz)   Height: 5' 11" (1.803 m)       Physical Exam  Exam conducted with a chaperone present.   Constitutional:       General: He is not in acute distress.     Appearance: He is not ill-appearing or toxic-appearing.   HENT:      Head: Normocephalic and atraumatic.      Mouth/Throat:      Mouth: Mucous membranes are moist.      Pharynx: Uvula midline. No pharyngeal swelling.   Cardiovascular:      Rate and Rhythm: Normal rate and regular rhythm.   Pulmonary:      Effort: Pulmonary effort is normal. No tachypnea, bradypnea, accessory muscle usage, prolonged expiration or respiratory distress.      Breath sounds: Normal breath sounds. No stridor. No wheezing, rhonchi or rales.   Lymphadenopathy:      Lower Body: No right inguinal adenopathy.   Neurological:      General: No focal deficit present.      Mental Status: He is alert.   Psychiatric:         Mood and Affect: Mood normal.         " Behavior: Behavior normal.         ASSESSMENT & PLAN:     Insomnia, unspecified type  Sleep hygiene was thoroughly reviewed with the patient   - Limit use of screens, especially before bed. No television, computer, or phone before bed.   - Reduce or stop drinking caffeine, especially at night. This can worsen sleep. Tea including green tea contains caffeine, so keep this in mind.   - Alcohol prevents you from getting good night sleep   - Bed is only for sleep and sex. Refrain from doing other activities like watching TV, listening to music, or reading a book  - Try not to keep a clock right by your bed. If you wake up and look at the clock it can be stimulating.   - Keep room dark, quiet,  and cool. You will sleep better in a cool dark room.   - If you wake up, get out of bed. Don't lay in bed awake. Do a relaxing activity without screens like reading. Try to minimize the number of lights you turn on.   - Avoid exercise within 2-3 hours of bed time.     Advised the patient to try Melatonin 5 mg qHS PRN and increase the dose up to 10 mg qHS PRN if no relief. Patient voiced understanding.   He also has risk factor of working overnight previously -- adjusting to the new sleep schedule   Follow up in 2-3 months if no relief     Exposure to chlamydia  -     Hepatitis Panel, Acute; Future; Expected date: 02/22/2024  -     HIV 1/2 Ag/Ab (4th Gen); Future; Expected date: 02/22/2024  -     RPR; Future; Expected date: 02/22/2024  -     C. trachomatis/N. gonorrhoeae by AMP DNA [facility]; Urine; Future; Expected date: 02/22/2024  Emphasized the importance of consistent condom use as the patient his female partner is in open sexual relationship. He verbalized understanding     Lymphadenopathy, inguinal  -     CBC Auto Differential; Future; Expected date: 02/22/2024    Differentials: kaposi sarcoma, malignancy, reactive lymphadenopathy from illness (flu), hernia, STD, inflammatory conditions (RA, lupus, IBS)      US groin  (1/25/2024):   There are several lymph nodes noted the largest measuring 3 x 1.4 cm and 2.6 x 1.5 cm.     Today, patient does not have any palpated right groin mass. Denies any groin tenderness.  Denies any fever, chills, night sweats, unintentional weight loss  Will obtain CBC diff again. Likely diagnosis of previous right inguinal lymphadenopathy is reactive lymphadenopathy     Eliz Cordoba MD  Family Medicine  [facility] [place]     This note was created using [employer] voice recognition software that occasionally misinterprets phrases or words

## 2024-02-23 LAB
C TRACH DNA SPEC QL NAA+PROBE: NOT DETECTED
N GONORRHOEA DNA SPEC QL NAA+PROBE: NOT DETECTED

## 2025-06-16 ENCOUNTER — OFFICE VISIT (OUTPATIENT)
Dept: FAMILY MEDICINE | Facility: CLINIC | Age: 30
End: 2025-06-16
Payer: COMMERCIAL

## 2025-06-16 ENCOUNTER — LAB VISIT (OUTPATIENT)
Dept: LAB | Facility: HOSPITAL | Age: 30
End: 2025-06-16
Attending: STUDENT IN AN ORGANIZED HEALTH CARE EDUCATION/TRAINING PROGRAM
Payer: COMMERCIAL

## 2025-06-16 VITALS
BODY MASS INDEX: 27.44 KG/M2 | OXYGEN SATURATION: 98 % | HEIGHT: 71 IN | DIASTOLIC BLOOD PRESSURE: 88 MMHG | SYSTOLIC BLOOD PRESSURE: 118 MMHG | WEIGHT: 196 LBS | HEART RATE: 81 BPM

## 2025-06-16 DIAGNOSIS — Z13.29 SCREENING FOR THYROID DISORDER: ICD-10-CM

## 2025-06-16 DIAGNOSIS — Z13.1 SCREENING FOR DIABETES MELLITUS (DM): ICD-10-CM

## 2025-06-16 DIAGNOSIS — Z13.6 ENCOUNTER FOR LIPID SCREENING FOR CARDIOVASCULAR DISEASE: ICD-10-CM

## 2025-06-16 DIAGNOSIS — Z11.3 SCREENING FOR STD (SEXUALLY TRANSMITTED DISEASE): ICD-10-CM

## 2025-06-16 DIAGNOSIS — G47.00 INSOMNIA, UNSPECIFIED TYPE: ICD-10-CM

## 2025-06-16 DIAGNOSIS — Z00.00 ROUTINE GENERAL MEDICAL EXAMINATION AT A HEALTH CARE FACILITY: Primary | ICD-10-CM

## 2025-06-16 DIAGNOSIS — Z13.220 ENCOUNTER FOR LIPID SCREENING FOR CARDIOVASCULAR DISEASE: ICD-10-CM

## 2025-06-16 DIAGNOSIS — Z00.00 ROUTINE GENERAL MEDICAL EXAMINATION AT A HEALTH CARE FACILITY: ICD-10-CM

## 2025-06-16 LAB
ABSOLUTE EOSINOPHIL (OHS): 0.07 K/UL
ABSOLUTE MONOCYTE (OHS): 0.54 K/UL (ref 0.3–1)
ABSOLUTE NEUTROPHIL COUNT (OHS): 2.4 K/UL (ref 1.8–7.7)
ALBUMIN SERPL BCP-MCNC: 4.7 G/DL (ref 3.5–5.2)
ALP SERPL-CCNC: 54 UNIT/L (ref 40–150)
ALT SERPL W/O P-5'-P-CCNC: 26 UNIT/L (ref 10–44)
ANION GAP (OHS): 9 MMOL/L (ref 8–16)
AST SERPL-CCNC: 23 UNIT/L (ref 11–45)
BASOPHILS # BLD AUTO: 0.02 K/UL
BASOPHILS NFR BLD AUTO: 0.4 %
BILIRUB SERPL-MCNC: 0.9 MG/DL (ref 0.1–1)
BUN SERPL-MCNC: 14 MG/DL (ref 6–20)
CALCIUM SERPL-MCNC: 9.6 MG/DL (ref 8.7–10.5)
CHLORIDE SERPL-SCNC: 105 MMOL/L (ref 95–110)
CHOLEST SERPL-MCNC: 188 MG/DL (ref 120–199)
CHOLEST/HDLC SERPL: 3.8 {RATIO} (ref 2–5)
CO2 SERPL-SCNC: 27 MMOL/L (ref 23–29)
CREAT SERPL-MCNC: 1.4 MG/DL (ref 0.5–1.4)
EAG (OHS): 94 MG/DL (ref 68–131)
ERYTHROCYTE [DISTWIDTH] IN BLOOD BY AUTOMATED COUNT: 12.2 % (ref 11.5–14.5)
GFR SERPLBLD CREATININE-BSD FMLA CKD-EPI: >60 ML/MIN/1.73/M2
GLUCOSE SERPL-MCNC: 84 MG/DL (ref 70–110)
HAV IGM SERPL QL IA: NORMAL
HBA1C MFR BLD: 4.9 % (ref 4–5.6)
HBV CORE IGM SERPL QL IA: NORMAL
HBV SURFACE AG SERPL QL IA: NORMAL
HCT VFR BLD AUTO: 49.1 % (ref 40–54)
HCV AB SERPL QL IA: NORMAL
HDLC SERPL-MCNC: 49 MG/DL (ref 40–75)
HDLC SERPL: 26.1 % (ref 20–50)
HGB BLD-MCNC: 16.6 GM/DL (ref 14–18)
HIV 1+2 AB+HIV1 P24 AG SERPL QL IA: NORMAL
IMM GRANULOCYTES # BLD AUTO: 0.01 K/UL (ref 0–0.04)
IMM GRANULOCYTES NFR BLD AUTO: 0.2 % (ref 0–0.5)
LDLC SERPL CALC-MCNC: 122.2 MG/DL (ref 63–159)
LYMPHOCYTES # BLD AUTO: 1.66 K/UL (ref 1–4.8)
MCH RBC QN AUTO: 29 PG (ref 27–31)
MCHC RBC AUTO-ENTMCNC: 33.8 G/DL (ref 32–36)
MCV RBC AUTO: 86 FL (ref 82–98)
NONHDLC SERPL-MCNC: 139 MG/DL
NUCLEATED RBC (/100WBC) (OHS): 0 /100 WBC
PLATELET # BLD AUTO: 232 K/UL (ref 150–450)
PMV BLD AUTO: 10.3 FL (ref 9.2–12.9)
POTASSIUM SERPL-SCNC: 4.3 MMOL/L (ref 3.5–5.1)
PROT SERPL-MCNC: 7.6 GM/DL (ref 6–8.4)
RBC # BLD AUTO: 5.73 M/UL (ref 4.6–6.2)
RELATIVE EOSINOPHIL (OHS): 1.5 %
RELATIVE LYMPHOCYTE (OHS): 35.3 % (ref 18–48)
RELATIVE MONOCYTE (OHS): 11.5 % (ref 4–15)
RELATIVE NEUTROPHIL (OHS): 51.1 % (ref 38–73)
SODIUM SERPL-SCNC: 141 MMOL/L (ref 136–145)
T PALLIDUM IGG+IGM SER QL: NORMAL
TRIGL SERPL-MCNC: 84 MG/DL (ref 30–150)
TSH SERPL-ACNC: 0.81 UIU/ML (ref 0.4–4)
WBC # BLD AUTO: 4.7 K/UL (ref 3.9–12.7)

## 2025-06-16 PROCEDURE — 99395 PREV VISIT EST AGE 18-39: CPT | Mod: S$GLB,,, | Performed by: STUDENT IN AN ORGANIZED HEALTH CARE EDUCATION/TRAINING PROGRAM

## 2025-06-16 PROCEDURE — 87389 HIV-1 AG W/HIV-1&-2 AB AG IA: CPT

## 2025-06-16 PROCEDURE — 1159F MED LIST DOCD IN RCRD: CPT | Mod: CPTII,S$GLB,, | Performed by: STUDENT IN AN ORGANIZED HEALTH CARE EDUCATION/TRAINING PROGRAM

## 2025-06-16 PROCEDURE — 3074F SYST BP LT 130 MM HG: CPT | Mod: CPTII,S$GLB,, | Performed by: STUDENT IN AN ORGANIZED HEALTH CARE EDUCATION/TRAINING PROGRAM

## 2025-06-16 PROCEDURE — 99999 PR PBB SHADOW E&M-EST. PATIENT-LVL III: CPT | Mod: PBBFAC,,, | Performed by: STUDENT IN AN ORGANIZED HEALTH CARE EDUCATION/TRAINING PROGRAM

## 2025-06-16 PROCEDURE — 82465 ASSAY BLD/SERUM CHOLESTEROL: CPT

## 2025-06-16 PROCEDURE — 86593 SYPHILIS TEST NON-TREP QUANT: CPT

## 2025-06-16 PROCEDURE — 3008F BODY MASS INDEX DOCD: CPT | Mod: CPTII,S$GLB,, | Performed by: STUDENT IN AN ORGANIZED HEALTH CARE EDUCATION/TRAINING PROGRAM

## 2025-06-16 PROCEDURE — 84132 ASSAY OF SERUM POTASSIUM: CPT

## 2025-06-16 PROCEDURE — 3079F DIAST BP 80-89 MM HG: CPT | Mod: CPTII,S$GLB,, | Performed by: STUDENT IN AN ORGANIZED HEALTH CARE EDUCATION/TRAINING PROGRAM

## 2025-06-16 PROCEDURE — 84443 ASSAY THYROID STIM HORMONE: CPT

## 2025-06-16 PROCEDURE — 36415 COLL VENOUS BLD VENIPUNCTURE: CPT | Mod: PO

## 2025-06-16 PROCEDURE — 85025 COMPLETE CBC W/AUTO DIFF WBC: CPT

## 2025-06-16 PROCEDURE — 1160F RVW MEDS BY RX/DR IN RCRD: CPT | Mod: CPTII,S$GLB,, | Performed by: STUDENT IN AN ORGANIZED HEALTH CARE EDUCATION/TRAINING PROGRAM

## 2025-06-16 PROCEDURE — 80074 ACUTE HEPATITIS PANEL: CPT

## 2025-06-16 PROCEDURE — 83036 HEMOGLOBIN GLYCOSYLATED A1C: CPT

## 2025-06-16 RX ORDER — DOXEPIN 3 MG/1
3 TABLET, FILM COATED ORAL NIGHTLY PRN
Qty: 30 TABLET | Refills: 0 | Status: SHIPPED | OUTPATIENT
Start: 2025-06-16

## 2025-06-16 NOTE — PROGRESS NOTES
[facility] [place]  Office Visit Note     SUBJECTIVE:   HPI: Torsten Delacruz Jr.  is a 29 y.o. male here for annual    I have not seen this patient since Feb 2024     History of Present Illness    CHIEF COMPLAINT:  Patient presents today for follow up of sleep issues.    SLEEP:  He reports improved sleep onset, though still delayed compared to baseline. He experiences 2-3 awakenings per night but is able to fall back asleep. Sleep duration varies with work schedule, ranging from 8-10 hours when not working. Sleep issues began after taking ibuprofen for previous stomach problems. Melatonin has helped with sleep onset but not with sleep maintenance.    SOCIAL HISTORY:  He works rotating daytime shifts including weekends. Last sexual activity was one month ago with a female partner using protection. He denies history of sexually transmitted diseases, penile discharge, or penile pain.     No visits with results within 6 Month(s) from this visit.   Latest known visit with results is:   Lab Visit on 02/22/2024   Component Date Value Ref Range Status    Chlamydia, Amplified DNA 02/22/2024 Not Detected  Not Detected Final    N gonorrhoeae, amplified DNA 02/22/2024 Not Detected  Not Detected Final         Medications Ordered Prior to Encounter[1]  Past Medical History:   Diagnosis Date    Bronchitis     once as a child    Hyperlipidemia      Past Surgical History:   Procedure Laterality Date    CIRCUMCISION       Past Surgical History:   Procedure Laterality Date    CIRCUMCISION       Family History   Problem Relation Name Age of Onset    Diabetes Mother      Colon cancer Neg Hx      Colon polyps Neg Hx      Esophageal cancer Neg Hx      Stomach cancer Neg Hx      Rectal cancer Neg Hx         Marital Status: Single  Alcohol History:  reports current alcohol use.  Tobacco History:  reports that he has never smoked. He has never been exposed to tobacco smoke. He has never used smokeless tobacco.  Drug History:   "reports no history of drug use.    Health Maintenance Topics with due status: Not Due       Topic Last Completion Date    Influenza Vaccine 11/02/2007    TETANUS VACCINE 11/11/2019    RSV Vaccine (Age 60+ and Pregnant patients) Not Due     Immunization History   Administered Date(s) Administered    COVID-19 Vaccine 04/19/2021, 05/26/2021, 07/01/2021, 07/29/2021, 02/11/2022    DTaP 1995, 01/10/1996, 03/07/1996, 09/17/1996, 10/08/1999    Hepatitis B, Pediatric/Adolescent [birth year], 04/18/1996    Influenza - Trivalent - Fluarix, Flulaval, Fluzone, Afluria - PF 11/02/2007    Influenza Whole 10/19/1998, 11/19/1998    MMR 09/17/1996, 10/08/1999    Meningococcal Conjugate (MCV4P) 10/19/2007, 08/03/2012    OPV 1995, 01/10/1996, 04/17/1997, 10/08/1999    PPD Test 05/24/1999, 10/08/1999    Tdap 12/28/2006, 11/11/2019    Varicella 10/19/2007       Review of patient's allergies indicates:   Allergen Reactions    Doxycycline Other (See Comments)     GI upset -gastritis , take with food , consider PPI if doxy needed     Ibuprofen Other (See Comments)     Gastritis- consider take with PPI if ibuprofen needed      Current Medications[2]    Review of Systems   Constitutional:  Negative for chills and fever.   Respiratory:  Negative for shortness of breath.    Cardiovascular:  Negative for chest pain.   Gastrointestinal:  Negative for abdominal pain, blood in stool, nausea and vomiting.   Genitourinary:  Negative for discharge, hematuria and penile pain.   Psychiatric/Behavioral:  Positive for sleep disturbance.        OBJECTIVE:      Vitals:    06/16/25 0714   BP: 118/88   BP Location: Right arm   Patient Position: Sitting   Pulse: 81   SpO2: 98%   Weight: 88.9 kg (195 lb 15.8 oz)   Height: 5' 11" (1.803 m)     Physical Exam  Constitutional:       General: He is not in acute distress.     Appearance: He is not ill-appearing or toxic-appearing.   HENT:      Head: Normocephalic and atraumatic.      " Mouth/Throat:      Mouth: Mucous membranes are moist.      Pharynx: Uvula midline. No pharyngeal swelling, oropharyngeal exudate or posterior oropharyngeal erythema.   Eyes:      Extraocular Movements: Extraocular movements intact.      Pupils: Pupils are equal, round, and reactive to light.   Cardiovascular:      Rate and Rhythm: Normal rate and regular rhythm.   Pulmonary:      Effort: Pulmonary effort is normal. No tachypnea, bradypnea, accessory muscle usage, prolonged expiration or respiratory distress.      Breath sounds: Normal breath sounds. No stridor. No wheezing, rhonchi or rales.   Abdominal:      General: Bowel sounds are normal. There is no distension.      Palpations: Abdomen is soft.      Tenderness: There is no abdominal tenderness. There is no guarding or rebound.   Neurological:      General: No focal deficit present.      Mental Status: He is alert.   Psychiatric:         Mood and Affect: Mood normal.         Behavior: Behavior normal.        Assessment:       1. Routine general medical examination at a health care facility    2. Insomnia, unspecified type    3. Encounter for lipid screening for cardiovascular disease    4. Screening for diabetes mellitus (DM)    5. Screening for thyroid disorder    6. Screening for STD (sexually transmitted disease)        Plan:       Routine general medical examination at a health care facility  -     CBC Auto Differential; Future; Expected date: 06/16/2025  -     Comprehensive Metabolic Panel; Future; Expected date: 06/16/2025  - Ordered comprehensive lab work including CBC, CMP, lipid panel, cholesterol, liver and renal function, blood count, diabetes screening, and thyroid function.  - Also ordered STD panel including HIV, hepatitis, gonorrhea, chlamydia, and syphilis (blood and urine tests) based on sexual activity history.    Insomnia, unspecified type  -     doxepin (SILENOR) 3 mg Tab; Take 3 mg by mouth nightly as needed (insomnia).  Dispense: 30 tablet;  Refill: 0  - Assessed patient's sleep issues, noting improvement in sleep onset with melatonin and sleep hygiene practices, but persistent sleep maintenance problems with 2-3 awakenings per night.  - Explained the difference between sleep onset and sleep maintenance issues.  - Prescribed new medication as needed for sleep maintenance before pursuing a sleep study.  - Instructed patient to contact the office in the next few weeks if still having trouble after trying the new medication.  - Patient to continue practicing sleep hygiene techniques.    Encounter for lipid screening for cardiovascular disease  -     Lipid Panel; Future; Expected date: 06/16/2025    Screening for diabetes mellitus (DM)  -     Hemoglobin A1C; Future; Expected date: 06/16/2025    Screening for thyroid disorder  -     TSH; Future; Expected date: 06/16/2025    Screening for STD (sexually transmitted disease)  -     C. trachomatis/N. gonorrhoeae by AMP DNA [facility]; Urine; Future; Expected date: 06/16/2025  -     HIV 1/2 Ag/Ab (4th Gen); Future; Expected date: 06/16/2025  -     Hepatitis Panel, Acute; Future; Expected date: 06/16/2025  -     Treponema Pallidium Antibodies IgG, IgM; Future; Expected date: 06/16/2025    FOLLOW-UP:  - Follow up in 1 year.     Counseled on age and gender appropriate medical preventative services, including cancer screenings, immunizations, overall nutritional health, need for a consistent exercise regimen and an overall push towards maintaining a vigorous and active lifestyle.        Eliz Cordoba M.D.  6/16/2025    This note was created using [employer] voice recognition software that occasionally misinterprets phrases or words            [1]   No current outpatient medications on file prior to visit.     No current facility-administered medications on file prior to visit.   [2]   Current Outpatient Medications:     doxepin (SILENOR) 3 mg Tab, Take 3 mg by mouth nightly as needed (insomnia)., Disp: 30 tablet, Rfl: 0

## 2025-06-17 ENCOUNTER — RESULTS FOLLOW-UP (OUTPATIENT)
Dept: FAMILY MEDICINE | Facility: CLINIC | Age: 30
End: 2025-06-17

## 2025-06-27 ENCOUNTER — PATIENT MESSAGE (OUTPATIENT)
Dept: FAMILY MEDICINE | Facility: CLINIC | Age: 30
End: 2025-06-27
Payer: COMMERCIAL

## 2025-06-27 DIAGNOSIS — G47.00 INSOMNIA, UNSPECIFIED TYPE: Primary | ICD-10-CM

## 2025-06-30 DIAGNOSIS — G47.00 INSOMNIA, UNSPECIFIED TYPE: Primary | ICD-10-CM

## 2025-07-07 ENCOUNTER — PATIENT MESSAGE (OUTPATIENT)
Dept: FAMILY MEDICINE | Facility: CLINIC | Age: 30
End: 2025-07-07
Payer: COMMERCIAL

## 2025-08-06 ENCOUNTER — PATIENT MESSAGE (OUTPATIENT)
Dept: FAMILY MEDICINE | Facility: CLINIC | Age: 30
End: 2025-08-06
Payer: COMMERCIAL

## 2025-08-13 ENCOUNTER — TELEPHONE (OUTPATIENT)
Dept: PULMONOLOGY | Facility: CLINIC | Age: 30
End: 2025-08-13
Payer: COMMERCIAL

## 2025-08-13 DIAGNOSIS — G47.30 SLEEP-DISORDERED BREATHING: Primary | ICD-10-CM

## 2025-08-14 ENCOUNTER — OFFICE VISIT (OUTPATIENT)
Dept: SLEEP MEDICINE | Facility: CLINIC | Age: 30
End: 2025-08-14
Payer: COMMERCIAL

## 2025-08-14 VITALS
BODY MASS INDEX: 27.78 KG/M2 | SYSTOLIC BLOOD PRESSURE: 130 MMHG | HEIGHT: 71 IN | WEIGHT: 198.44 LBS | RESPIRATION RATE: 24 BRPM | DIASTOLIC BLOOD PRESSURE: 80 MMHG | OXYGEN SATURATION: 99 % | HEART RATE: 72 BPM

## 2025-08-14 DIAGNOSIS — E78.5 HYPERLIPIDEMIA, UNSPECIFIED HYPERLIPIDEMIA TYPE: ICD-10-CM

## 2025-08-14 DIAGNOSIS — G47.00 INSOMNIA, UNSPECIFIED TYPE: Primary | ICD-10-CM

## 2025-08-14 PROCEDURE — 1159F MED LIST DOCD IN RCRD: CPT | Mod: CPTII,S$GLB,, | Performed by: INTERNAL MEDICINE

## 2025-08-14 PROCEDURE — 1160F RVW MEDS BY RX/DR IN RCRD: CPT | Mod: CPTII,S$GLB,, | Performed by: INTERNAL MEDICINE

## 2025-08-14 PROCEDURE — 3079F DIAST BP 80-89 MM HG: CPT | Mod: CPTII,S$GLB,, | Performed by: INTERNAL MEDICINE

## 2025-08-14 PROCEDURE — 3044F HG A1C LEVEL LT 7.0%: CPT | Mod: CPTII,S$GLB,, | Performed by: INTERNAL MEDICINE

## 2025-08-14 PROCEDURE — 3008F BODY MASS INDEX DOCD: CPT | Mod: CPTII,S$GLB,, | Performed by: INTERNAL MEDICINE

## 2025-08-14 PROCEDURE — 99204 OFFICE O/P NEW MOD 45 MIN: CPT | Mod: S$GLB,,, | Performed by: INTERNAL MEDICINE

## 2025-08-14 PROCEDURE — 3075F SYST BP GE 130 - 139MM HG: CPT | Mod: CPTII,S$GLB,, | Performed by: INTERNAL MEDICINE

## 2025-08-14 PROCEDURE — 99999 PR PBB SHADOW E&M-EST. PATIENT-LVL IV: CPT | Mod: PBBFAC,,, | Performed by: INTERNAL MEDICINE
